# Patient Record
Sex: FEMALE | NOT HISPANIC OR LATINO | Employment: PART TIME | URBAN - METROPOLITAN AREA
[De-identification: names, ages, dates, MRNs, and addresses within clinical notes are randomized per-mention and may not be internally consistent; named-entity substitution may affect disease eponyms.]

---

## 2017-02-03 DIAGNOSIS — E66.9 OBESITY, UNSPECIFIED OBESITY SEVERITY, UNSPECIFIED OBESITY TYPE: ICD-10-CM

## 2017-02-03 RX ORDER — PHENTERMINE HYDROCHLORIDE 37.5 MG/1
37.5 CAPSULE ORAL EVERY MORNING
Qty: 30 CAP | Refills: 0 | Status: SHIPPED | OUTPATIENT
Start: 2017-02-03 | End: 2017-04-17

## 2017-02-03 NOTE — TELEPHONE ENCOUNTER
Was the patient seen in the last year in this department? Yes 10/20/2016    Does patient have an active prescription for medications requested? No     Received Request Via: Patient

## 2017-03-06 ENCOUNTER — OFFICE VISIT (OUTPATIENT)
Dept: MEDICAL GROUP | Facility: PHYSICIAN GROUP | Age: 37
End: 2017-03-06
Payer: COMMERCIAL

## 2017-03-06 VITALS
WEIGHT: 170 LBS | RESPIRATION RATE: 16 BRPM | OXYGEN SATURATION: 96 % | HEART RATE: 78 BPM | HEIGHT: 61 IN | BODY MASS INDEX: 32.1 KG/M2 | TEMPERATURE: 97.5 F | SYSTOLIC BLOOD PRESSURE: 122 MMHG | DIASTOLIC BLOOD PRESSURE: 88 MMHG

## 2017-03-06 DIAGNOSIS — E66.9 OBESITY, UNSPECIFIED OBESITY SEVERITY, UNSPECIFIED OBESITY TYPE: ICD-10-CM

## 2017-03-06 PROCEDURE — 99214 OFFICE O/P EST MOD 30 MIN: CPT | Performed by: NURSE PRACTITIONER

## 2017-03-06 RX ORDER — PHENTERMINE HYDROCHLORIDE 37.5 MG/1
37.5 TABLET ORAL
Qty: 30 TAB | Refills: 0 | Status: SHIPPED | OUTPATIENT
Start: 2017-03-06 | End: 2017-04-17 | Stop reason: SDUPTHER

## 2017-03-06 NOTE — MR AVS SNAPSHOT
"        Ceci Alan   3/6/2017 2:40 PM   Office Visit   MRN: [id number]    Department:  [facility]   Dept Phone:  [phone]    Description:  Female : 1980   Provider:  NARENDRA Shields           Reason for Visit     Follow-Up medications       Allergies as of 3/6/2017     Allergen Noted Reactions    Pcn [Penicillins] 2009         You were diagnosed with     Obesity, unspecified obesity severity, unspecified obesity type   [[id number]]         Vital Signs     Blood Pressure Pulse Temperature Respirations Height Weight    122/88 mmHg 78 36.4 °C (97.5 °F) 16 1.549 m (5' 0.98\") 77.111 kg (170 lb)    Body Mass Index Oxygen Saturation Last Menstrual Period Smoking Status          32.14 kg/m2 96% 02/10/2017 Never Smoker         Basic Information     Date Of Birth Sex Race Ethnicity Preferred Language    1980 Female  or  Non- English      Your appointments     Apr 10, 2017  8:40 AM   Established Patient with NARENDRA Shields   [address] [address]   [phone]           You will be receiving a confirmation call a few days before your appointment from our automated call confirmation system.              Problem List              ICD-10-CM Priority Class Noted - Resolved    PCOS (polycystic ovarian syndrome) E28.2   2013 - Present    Obesity E66.9   2016 - Present    Acne L70.9   10/20/2016 - Present      Health Maintenance        Date Due Completion Dates    IMM DTaP/Tdap/Td Vaccine (1 - Tdap) 2000 ---    PAP SMEAR 2002 ---    IMM INFLUENZA (1) 2016 ---            Current Immunizations     No immunizations on file.      Below and/or attached are the medications your provider expects you to take. Review all of your home medications and newly ordered medications with your provider and/or pharmacist. Follow medication instructions as directed by your provider " and/or pharmacist. Please keep your medication list with you and share with your provider. Update the information when medications are discontinued, doses are changed, or new medications (including over-the-counter products) are added; and carry medication information at all times in the event of emergency situations     Allergies:  PCN - (reactions not documented)               Medications  Valid as of: March 06, 2017 -  3:09 PM    Generic Name Brand Name Tablet Size Instructions for use    Azithromycin (Tab) ZITHROMAX 250 MG 2 po today, one daily until finished.        Clindamycin Phosphate (Gel) CLEOCIN T 1 % Apply twice a day        Ergocalciferol (Cap) DRISDOL 62240 UNIT Take 1 cap by mouth every day for 10 days, and then take 1 cap by mouth every Tuesday and Saturday.        MetFORMIN HCl (Tab) GLUCOPHAGE 500 MG Take 500 mg by mouth 2 times a day, with meals. Takes to regulate her period. Her doctor is Bandar Shen         Phentermine HCl (Cap) phentermine 37.5 MG Take 1 Cap by mouth every morning.        Phentermine HCl (Tab) ADIPEX-P 37.5 MG Take 1 Tab by mouth every morning before breakfast.        Tretinoin (Cream) RETIN-A 0.05 % Apply at bedtime        .                 Medicines prescribed today were sent to:     [email or web address] [address] [place] [address] [place], [place] - [address] [facility] [place] [address] [address] [place] [address]    Phone: [phone] Fax: [phone]    Open 24 Hours?: No      Medication refill instructions:       If your prescription bottle indicates you have medication refills left, it is not necessary to call your provider’s office. Please contact your pharmacy and they will refill your medication.    If your prescription bottle indicates you do not have any refills left, you may request refills at any time through one of the following ways: The online [employer] system (except Urgent Care), by calling your provider’s office, or by asking your pharmacy to contact your  provider’s office with a refill request. Medication refills are processed only during regular business hours and may not be available until the next business day. Your provider may request additional information or to have a follow-up visit with you prior to refilling your medication.   *Please Note: Medication refills are assigned a new Rx number when refilled electronically. Your pharmacy may indicate that no refills were authorized even though a new prescription for the same medication is available at the pharmacy. Please request the medicine by name with the pharmacy before contacting your provider for a refill.           [employer] Access Code: Activation code not generated  Current [employer] Status: Active

## 2017-03-07 NOTE — PROGRESS NOTES
Chief Complaint   Patient presents with   • Follow-Up     medications        HISTORY OF PRESENT ILLNESS: Patient is a 36 y.o. female established patient who presents today to discuss the following issues:    Obesity  Plans to start scheduling time at the gym so that she stops making excuses.  Still has a few phentermine left from November.  We discussed diet, exercise, and lifestyle modification at length.  She would like to restart the phentermine, start working out at the gym, and follow-up with me monthly to maintain accountability.      Patient Active Problem List    Diagnosis Date Noted   • Acne 10/20/2016   • Obesity 09/20/2016   • PCOS (polycystic ovarian syndrome) 05/13/2013       Allergies:Pcn    Current Outpatient Prescriptions   Medication Sig Dispense Refill   • phentermine (ADIPEX-P) 37.5 MG tablet Take 1 Tab by mouth every morning before breakfast. 30 Tab 0   • clindamycin (CLEOCIN T) 1 % Gel Apply twice a day 1 Tube 6   • tretinoin (RETIN-A) 0.05 % cream Apply at bedtime 45 g 3   • ergocalciferol (DRISDOL) 06897 UNIT capsule Take 1 cap by mouth every day for 10 days, and then take 1 cap by mouth every Tuesday and Saturday. 16 Cap 0   • phentermine 37.5 MG capsule Take 1 Cap by mouth every morning. 30 Cap 0   • azithromycin (ZITHROMAX) 250 MG Tab 2 po today, one daily until finished. 6 Tab 0   • METFORMIN 500 MG TABS Take 500 mg by mouth 2 times a day, with meals. Takes to regulate her period. Her doctor is Bandar Shen        No current facility-administered medications for this visit.       Social History   Substance Use Topics   • Smoking status: Never Smoker    • Smokeless tobacco: Never Used   • Alcohol Use: 0.0 oz/week     0 Cans of beer per week      Comment: occasionally       Family Status   Relation Status Death Age   • Mother Alive    • Father Alive    • Sister Alive    • Brother Alive    • Maternal Aunt Alive    • Maternal Uncle Alive    • Paternal Aunt Alive    • Paternal Uncle Alive   "  • Maternal Grandmother     • Maternal Grandfather     • Paternal Grandmother Alive    • Paternal Grandfather       Family History   Problem Relation Age of Onset   • Arthritis Mother    • Cancer Father      Brain   • Stroke Father    • Diabetes Maternal Grandmother    • Heart Disease Maternal Grandmother    • Hypertension Maternal Grandmother    • Hyperlipidemia Maternal Grandmother    • Diabetes Maternal Grandfather    • Heart Disease Maternal Grandfather    • Hypertension Maternal Grandfather    • Hyperlipidemia Maternal Grandfather        Review of Systems:   Constitutional: Negative for fever, chills, weight loss and malaise/fatigue.   HENT: Negative for ear pain, nosebleeds, congestion, sore throat and neck pain.    Eyes: Negative for blurred vision.   Respiratory: Negative for cough, sputum production, shortness of breath and wheezing.    Cardiovascular: Negative for chest pain, palpitations, orthopnea and leg swelling.   Gastrointestinal: Negative for heartburn, nausea, vomiting and abdominal pain.   Genitourinary: Negative for dysuria, urgency and frequency.   Musculoskeletal: Negative for myalgias, joint pain, and back pain.  Skin: Negative for rash and itching.   Neurological: Negative for dizziness, tingling, tremors, sensory change, focal weakness and headaches.   Endo/Heme/Allergies: Does not bruise/bleed easily.   Psychiatric/Behavioral: Negative for depression, suicidal ideas and memory loss.  The patient is not nervous/anxious and does not have insomnia.    All other systems reviewed and are negative except as in HPI.    Exam:  Blood pressure 122/88, pulse 78, temperature 36.4 °C (97.5 °F), resp. rate 16, height 1.549 m (5' 0.98\"), weight 77.111 kg (170 lb), last menstrual period 02/10/2017, SpO2 96 %.  General:  Well nourished, well developed female in NAD  Head: Grossly normal.  Neck: Supple without JVD or bruit. Thyroid is not enlarged.  Pulmonary: Clear to ausculation. " Normal effort. No rales, ronchi, or wheezing.  Cardiovascular: Regular rate and rhythm without murmur.   Extremities: No clubbing, cyanosis, or edema.  Skin: Intact with no obvious rashes or lesions.  Neuro: Grossly intact.  Psych: Alert and oriented x 3.  Mood and affect appropriate.    Medical decision-making and discussion: Ceci is here today to discuss weight loss.  She was given a prescription for phentermine, and she will start working out.  She will plan to follow-up monthly.          Assessment/Plan:  1. Obesity, unspecified obesity severity, unspecified obesity type  phentermine (ADIPEX-P) 37.5 MG tablet       Return in about 4 weeks (around 4/3/2017).    Please note that this dictation was created using voice recognition software. I have made every reasonable attempt to correct obvious errors, but I expect that there are errors of grammar and possibly content that I did not discover before finalizing the note.

## 2017-03-07 NOTE — ASSESSMENT & PLAN NOTE
Plans to start scheduling time at the gym so that she stops making excuses.  Still has a few phentermine left from November.  We discussed diet, exercise, and lifestyle modification at length.  She would like to restart the phentermine, start working out at the gym, and follow-up with me monthly to maintain accountability.

## 2017-04-17 ENCOUNTER — OFFICE VISIT (OUTPATIENT)
Dept: MEDICAL GROUP | Facility: PHYSICIAN GROUP | Age: 37
End: 2017-04-17
Payer: COMMERCIAL

## 2017-04-17 VITALS
SYSTOLIC BLOOD PRESSURE: 130 MMHG | WEIGHT: 171 LBS | HEART RATE: 70 BPM | RESPIRATION RATE: 16 BRPM | OXYGEN SATURATION: 94 % | DIASTOLIC BLOOD PRESSURE: 74 MMHG | HEIGHT: 60 IN | TEMPERATURE: 98 F | BODY MASS INDEX: 33.57 KG/M2

## 2017-04-17 DIAGNOSIS — E66.9 OBESITY, UNSPECIFIED OBESITY SEVERITY, UNSPECIFIED OBESITY TYPE: ICD-10-CM

## 2017-04-17 PROCEDURE — 99214 OFFICE O/P EST MOD 30 MIN: CPT | Performed by: NURSE PRACTITIONER

## 2017-04-17 RX ORDER — PHENTERMINE HYDROCHLORIDE 37.5 MG/1
37.5 TABLET ORAL
Qty: 30 TAB | Refills: 0 | Status: SHIPPED | OUTPATIENT
Start: 2017-04-17 | End: 2019-02-25 | Stop reason: SDUPTHER

## 2017-04-17 NOTE — ASSESSMENT & PLAN NOTE
Up 1 pound from last visit, but celebrated for her birthday.  Would like to continue with phentermine.  Discussed plan.

## 2017-04-17 NOTE — MR AVS SNAPSHOT
Ceci Phillips   2017 3:40 PM   Office Visit   MRN: [id number]    Department:  [facility]   Dept Phone:  [phone]    Description:  Female : 1980   Provider:  NARENDRA Hightower           Reason for Visit     Follow-Up medication       Allergies as of 2017     Allergen Noted Reactions    Pcn [Penicillins] 2009         You were diagnosed with     Obesity, unspecified obesity severity, unspecified obesity type   [[id number]]         Vital Signs     Blood Pressure Pulse Temperature Respirations Height Weight    130/74 mmHg 70 36.7 °C (98 °F) 16 1.524 m (5') 77.565 kg (171 lb)    Body Mass Index Oxygen Saturation Last Menstrual Period Smoking Status          33.40 kg/m2 94% 2017 Never Smoker         Basic Information     Date Of Birth Sex Race Ethnicity Preferred Language    1980 Female  or  Non- English      Problem List              ICD-10-CM Priority Class Noted - Resolved    PCOS (polycystic ovarian syndrome) E28.2   2013 - Present    Obesity E66.9   2016 - Present    Acne L70.9   10/20/2016 - Present      Health Maintenance        Date Due Completion Dates    IMM DTaP/Tdap/Td Vaccine (1 - Tdap) 2000 ---    PAP SMEAR 2002 ---            Current Immunizations     No immunizations on file.      Below and/or attached are the medications your provider expects you to take. Review all of your home medications and newly ordered medications with your provider and/or pharmacist. Follow medication instructions as directed by your provider and/or pharmacist. Please keep your medication list with you and share with your provider. Update the information when medications are discontinued, doses are changed, or new medications (including over-the-counter products) are added; and carry medication information at all times in the event of emergency situations     Allergies:  PCN - (reactions not documented)               Medications  Valid as  of: April 17, 2017 -  4:04 PM    Generic Name Brand Name Tablet Size Instructions for use    Azithromycin (Tab) ZITHROMAX 250 MG 2 po today, one daily until finished.        Clindamycin Phosphate (Gel) CLEOCIN T 1 % Apply twice a day        Ergocalciferol (Cap) DRISDOL 42703 UNIT Take 1 cap by mouth every day for 10 days, and then take 1 cap by mouth every Tuesday and Saturday.        MetFORMIN HCl (Tab) GLUCOPHAGE 500 MG Take 500 mg by mouth 2 times a day, with meals. Takes to regulate her period. Her doctor is Bandar Shen         Phentermine HCl (Tab) ADIPEX-P 37.5 MG Take 1 Tab by mouth every morning before breakfast.        Tretinoin (Cream) RETIN-A 0.05 % Apply at bedtime        .                 Medicines prescribed today were sent to:     [facility] [address] [place] [address], [place] [address] [facility] [place] [address] [address] [place] [address]    Phone: [phone] Fax: [phone]    Open 24 Hours?: No      Medication refill instructions:       If your prescription bottle indicates you have medication refills left, it is not necessary to call your provider’s office. Please contact your pharmacy and they will refill your medication.    If your prescription bottle indicates you do not have any refills left, you may request refills at any time through one of the following ways: The online [employer] system (except Urgent Care), by calling your provider’s office, or by asking your pharmacy to contact your provider’s office with a refill request. Medication refills are processed only during regular business hours and may not be available until the next business day. Your provider may request additional information or to have a follow-up visit with you prior to refilling your medication.   *Please Note: Medication refills are assigned a new Rx number when refilled electronically. Your pharmacy may indicate that no refills were authorized even though a new prescription for the same medication  is available at the pharmacy. Please request the medicine by name with the pharmacy before contacting your provider for a refill.           MyChart Access Code: Activation code not generated  Current MyChart Status: Active

## 2017-04-18 NOTE — PROGRESS NOTES
Chief Complaint   Patient presents with   • Follow-Up     medication        HISTORY OF PRESENT ILLNESS: Patient is a 37 y.o. female established patient who presents today to discuss the following issues:    Obesity  Up 1 pound from last visit, but celebrated for her birthday.  Would like to continue with phentermine.  Discussed plan.        Patient Active Problem List    Diagnosis Date Noted   • Acne 10/20/2016   • Obesity 2016   • PCOS (polycystic ovarian syndrome) 2013       Allergies:Pcn    Current Outpatient Prescriptions   Medication Sig Dispense Refill   • phentermine (ADIPEX-P) 37.5 MG tablet Take 1 Tab by mouth every morning before breakfast. 30 Tab 0   • clindamycin (CLEOCIN T) 1 % Gel Apply twice a day 1 Tube 6   • tretinoin (RETIN-A) 0.05 % cream Apply at bedtime 45 g 3   • ergocalciferol (DRISDOL) 26583 UNIT capsule Take 1 cap by mouth every day for 10 days, and then take 1 cap by mouth every Tuesday and Saturday. 16 Cap 0   • METFORMIN 500 MG TABS Take 500 mg by mouth 2 times a day, with meals. Takes to regulate her period. Her doctor is Bandar Shen      • azithromycin (ZITHROMAX) 250 MG Tab 2 po today, one daily until finished. 6 Tab 0     No current facility-administered medications for this visit.       Social History   Substance Use Topics   • Smoking status: Never Smoker    • Smokeless tobacco: Never Used   • Alcohol Use: 0.0 oz/week     0 Cans of beer per week      Comment: occasionally       Family Status   Relation Status Death Age   • Mother Alive    • Father Alive    • Sister Alive    • Brother Alive    • Maternal Aunt Alive    • Maternal Uncle Alive    • Paternal Aunt Alive    • Paternal Uncle Alive    • Maternal Grandmother     • Maternal Grandfather     • Paternal Grandmother Alive    • Paternal Grandfather       Family History   Problem Relation Age of Onset   • Arthritis Mother    • Cancer Father      Brain   • Stroke Father    • Diabetes Maternal  Grandmother    • Heart Disease Maternal Grandmother    • Hypertension Maternal Grandmother    • Hyperlipidemia Maternal Grandmother    • Diabetes Maternal Grandfather    • Heart Disease Maternal Grandfather    • Hypertension Maternal Grandfather    • Hyperlipidemia Maternal Grandfather        Review of Systems:   Constitutional: Negative for fever, chills, weight loss and malaise/fatigue.   HENT: Negative for ear pain, nosebleeds, congestion, sore throat and neck pain.    Eyes: Negative for blurred vision.   Respiratory: Negative for cough, sputum production, shortness of breath and wheezing.    Cardiovascular: Negative for chest pain, palpitations, orthopnea and leg swelling.   Gastrointestinal: Negative for heartburn, nausea, vomiting and abdominal pain.   Genitourinary: Negative for dysuria, urgency and frequency.   Musculoskeletal: Negative for myalgias, joint pain, and back pain.  Skin: Negative for rash and itching.   Neurological: Negative for dizziness, tingling, tremors, sensory change, focal weakness and headaches.   Endo/Heme/Allergies: Does not bruise/bleed easily.   Psychiatric/Behavioral: Negative for depression, suicidal ideas and memory loss.  The patient is not nervous/anxious and does not have insomnia.    All other systems reviewed and are negative except as in HPI.    Exam:  Blood pressure 130/74, pulse 70, temperature 36.7 °C (98 °F), resp. rate 16, height 1.524 m (5'), weight 77.565 kg (171 lb), last menstrual period 03/27/2017, SpO2 94 %.  General:  Well nourished, well developed female in NAD  Head: Grossly normal.  Neck: Supple without JVD or bruit. Thyroid is not enlarged.  Pulmonary: Clear to ausculation. Normal effort. No rales, ronchi, or wheezing.  Cardiovascular: Regular rate and rhythm without murmur.   Extremities: No clubbing, cyanosis, or edema.  Skin: Intact with no obvious rashes or lesions.  Neuro: Grossly intact.  Psych: Alert and oriented x 3.  Mood and affect  appropriate.    Medical decision-making and discussion: Ceci is here today for follow-up.  She was given a prescription for phentermine.  She will follow-up here in about 1 month.          Assessment/Plan:  1. Obesity, unspecified obesity severity, unspecified obesity type  phentermine (ADIPEX-P) 37.5 MG tablet       Return in about 4 weeks (around 5/15/2017).    Please note that this dictation was created using voice recognition software. I have made every reasonable attempt to correct obvious errors, but I expect that there are errors of grammar and possibly content that I did not discover before finalizing the note.

## 2018-09-06 ENCOUNTER — OFFICE VISIT (OUTPATIENT)
Dept: MEDICAL GROUP | Facility: PHYSICIAN GROUP | Age: 38
End: 2018-09-06
Payer: COMMERCIAL

## 2018-09-06 VITALS
DIASTOLIC BLOOD PRESSURE: 80 MMHG | TEMPERATURE: 97.6 F | BODY MASS INDEX: 34.95 KG/M2 | HEIGHT: 60 IN | OXYGEN SATURATION: 97 % | WEIGHT: 178 LBS | HEART RATE: 68 BPM | RESPIRATION RATE: 16 BRPM | SYSTOLIC BLOOD PRESSURE: 120 MMHG

## 2018-09-06 DIAGNOSIS — Z00.00 WELLNESS EXAMINATION: ICD-10-CM

## 2018-09-06 DIAGNOSIS — L85.3 DRY SKIN: ICD-10-CM

## 2018-09-06 PROCEDURE — 99214 OFFICE O/P EST MOD 30 MIN: CPT | Performed by: NURSE PRACTITIONER

## 2018-09-06 RX ORDER — PHENTERMINE HYDROCHLORIDE 37.5 MG/1
37.5 TABLET ORAL
Qty: 30 TAB | Refills: 0 | Status: SHIPPED | OUTPATIENT
Start: 2018-09-06 | End: 2018-10-06

## 2018-09-06 ASSESSMENT — PATIENT HEALTH QUESTIONNAIRE - PHQ9: CLINICAL INTERPRETATION OF PHQ2 SCORE: 0

## 2018-09-06 NOTE — PROGRESS NOTES
Chief Complaint   Patient presents with   • Orders Needed     labs   • Breast Problem     dry patch on left breast        HISTORY OF PRESENT ILLNESS: Patient is a 38 y.o. female established patient who presents today to discuss the following issues:    Wellness examination  Is here for her annual wellness visit.  Is due for lab work.    Dry skin  Would like a refill of hydrocortisone.    BMI 34.0-34.9,adult  Patient is aware of BMI elevation.  Brief discussion of diet, exercise, and lifestyle modification.    Ceci would like to restart phentermine.  It works well for her and she tolerates it well.      Patient Active Problem List    Diagnosis Date Noted   • Wellness examination 10/08/2018   • Dry skin 10/08/2018   • BMI 34.0-34.9,adult 10/08/2018   • Acne 10/20/2016   • Obesity 2016   • PCOS (polycystic ovarian syndrome) 2013       Allergies:Pcn [penicillins]    Current Outpatient Prescriptions   Medication Sig Dispense Refill   • hydrocortisone 2.5 % Cream topical cream Apply 1 Application to affected area(s) 2 times a day. 1 Tube 2   • METFORMIN 500 MG TABS Take 500 mg by mouth 2 times a day, with meals. Takes to regulate her period. Her doctor is Bandar Shen      • ergocalciferol (DRISDOL) 40177 UNIT capsule Take 1 cap by mouth every day for 10 days, and then take 1 cap by mouth every Tuesday and Saturday until prescription is completed. 16 Cap 0   • phentermine (ADIPEX-P) 37.5 MG tablet Take 1 Tab by mouth every morning before breakfast. 30 Tab 0     No current facility-administered medications for this visit.        Social History   Substance Use Topics   • Smoking status: Never Smoker   • Smokeless tobacco: Never Used   • Alcohol use 0.0 oz/week      Comment: occasionally       Family Status   Relation Status   • Mo Alive   • Fa Alive   • Sis Alive   • Bro Alive   • MAunt Alive   • MUnc Alive   • PAunt Alive   • PUnc Alive   • MGMo    • MGFa    • PGMo Alive   • PGFa       Family History   Problem Relation Age of Onset   • Arthritis Mother    • Cancer Father         Brain   • Stroke Father    • Diabetes Maternal Grandmother    • Heart Disease Maternal Grandmother    • Hypertension Maternal Grandmother    • Hyperlipidemia Maternal Grandmother    • Diabetes Maternal Grandfather    • Heart Disease Maternal Grandfather    • Hypertension Maternal Grandfather    • Hyperlipidemia Maternal Grandfather        Review of Systems:   Constitutional: Negative for fever, chills, weight loss and malaise/fatigue.   HENT: Negative for ear pain, nosebleeds, congestion, sore throat and neck pain.    Eyes: Negative for blurred vision.   Respiratory: Negative for cough, sputum production, shortness of breath and wheezing.    Cardiovascular: Negative for chest pain, palpitations, orthopnea and leg swelling.   Gastrointestinal: Negative for heartburn, nausea, vomiting and abdominal pain.   Genitourinary: Negative for dysuria, urgency and frequency.   Musculoskeletal: Negative for myalgias, joint pain, and back pain.  Skin: Negative for rash and itching.  Positive for dry skin.  Neurological: Negative for dizziness, tingling, tremors, sensory change, focal weakness and headaches.   Endo/Heme/Allergies: Does not bruise/bleed easily.   Psychiatric/Behavioral: Negative for depression, suicidal ideas and memory loss.  The patient is not nervous/anxious and does not have insomnia.    All other systems reviewed and are negative except as in HPI.    Exam:  Blood pressure 120/80, pulse 68, temperature 36.4 °C (97.6 °F), resp. rate 16, height 1.524 m (5'), weight 80.7 kg (178 lb), SpO2 97 %, not currently breastfeeding.  General:  Well nourished, well developed female in NAD  Head: Grossly normal.  Neck: Supple without JVD or bruit. Thyroid is not enlarged.  Pulmonary: Clear to ausculation. Normal effort. No rales, ronchi, or wheezing.  Cardiovascular: Regular rate and rhythm without murmur.   Abdomen:  Soft,  nontender, nondistended.  Extremities: No clubbing, cyanosis, or edema.  Skin: Intact with no obvious rashes or lesions.  Neuro: Grossly intact.  Psych: Alert and oriented x 3.  Mood and affect appropriate.    Medical decision-making and discussion: Ceci is here today for her annual wellness visit.  Lab work was ordered, and she was given prescriptions for hydrocortisone and phentermine.  SHe will follow-up here as needed.          Assessment/Plan:  1. Wellness examination  CBC WITH DIFFERENTIAL    COMP METABOLIC PANEL    LIPID PROFILE    TSH    VITAMIN D,25 HYDROXY   2. Dry skin  hydrocortisone 2.5 % Cream topical cream   3. BMI 34.0-34.9,adult  phentermine (ADIPEX-P) 37.5 MG tablet       Return if symptoms worsen or fail to improve.    Please note that this dictation was created using voice recognition software. I have made every reasonable attempt to correct obvious errors, but I expect that there are errors of grammar and possibly content that I did not discover before finalizing the note.

## 2018-09-13 ENCOUNTER — HOSPITAL ENCOUNTER (OUTPATIENT)
Dept: LAB | Facility: MEDICAL CENTER | Age: 38
End: 2018-09-13
Attending: NURSE PRACTITIONER
Payer: COMMERCIAL

## 2018-09-13 DIAGNOSIS — Z00.00 WELLNESS EXAMINATION: ICD-10-CM

## 2018-09-13 LAB
25(OH)D3 SERPL-MCNC: 22 NG/ML (ref 30–100)
ALBUMIN SERPL BCP-MCNC: 4.5 G/DL (ref 3.2–4.9)
ALBUMIN/GLOB SERPL: 1.5 G/DL
ALP SERPL-CCNC: 78 U/L (ref 30–99)
ALT SERPL-CCNC: 48 U/L (ref 2–50)
ANION GAP SERPL CALC-SCNC: 10 MMOL/L (ref 0–11.9)
AST SERPL-CCNC: 22 U/L (ref 12–45)
BASOPHILS # BLD AUTO: 0.9 % (ref 0–1.8)
BASOPHILS # BLD: 0.06 K/UL (ref 0–0.12)
BILIRUB SERPL-MCNC: 0.7 MG/DL (ref 0.1–1.5)
BUN SERPL-MCNC: 13 MG/DL (ref 8–22)
CALCIUM SERPL-MCNC: 9.2 MG/DL (ref 8.5–10.5)
CHLORIDE SERPL-SCNC: 105 MMOL/L (ref 96–112)
CHOLEST SERPL-MCNC: 151 MG/DL (ref 100–199)
CO2 SERPL-SCNC: 24 MMOL/L (ref 20–33)
CREAT SERPL-MCNC: 0.63 MG/DL (ref 0.5–1.4)
EOSINOPHIL # BLD AUTO: 0.2 K/UL (ref 0–0.51)
EOSINOPHIL NFR BLD: 3.1 % (ref 0–6.9)
ERYTHROCYTE [DISTWIDTH] IN BLOOD BY AUTOMATED COUNT: 39.3 FL (ref 35.9–50)
GLOBULIN SER CALC-MCNC: 3.1 G/DL (ref 1.9–3.5)
GLUCOSE SERPL-MCNC: 92 MG/DL (ref 65–99)
HCT VFR BLD AUTO: 42.2 % (ref 37–47)
HDLC SERPL-MCNC: 40 MG/DL
HGB BLD-MCNC: 13.9 G/DL (ref 12–16)
IMM GRANULOCYTES # BLD AUTO: 0.01 K/UL (ref 0–0.11)
IMM GRANULOCYTES NFR BLD AUTO: 0.2 % (ref 0–0.9)
LDLC SERPL CALC-MCNC: 94 MG/DL
LYMPHOCYTES # BLD AUTO: 1.87 K/UL (ref 1–4.8)
LYMPHOCYTES NFR BLD: 28.7 % (ref 22–41)
MCH RBC QN AUTO: 27.3 PG (ref 27–33)
MCHC RBC AUTO-ENTMCNC: 32.9 G/DL (ref 33.6–35)
MCV RBC AUTO: 82.9 FL (ref 81.4–97.8)
MONOCYTES # BLD AUTO: 0.28 K/UL (ref 0–0.85)
MONOCYTES NFR BLD AUTO: 4.3 % (ref 0–13.4)
NEUTROPHILS # BLD AUTO: 4.1 K/UL (ref 2–7.15)
NEUTROPHILS NFR BLD: 62.8 % (ref 44–72)
NRBC # BLD AUTO: 0 K/UL
NRBC BLD-RTO: 0 /100 WBC
PLATELET # BLD AUTO: 240 K/UL (ref 164–446)
PMV BLD AUTO: 13 FL (ref 9–12.9)
POTASSIUM SERPL-SCNC: 4 MMOL/L (ref 3.6–5.5)
PROT SERPL-MCNC: 7.6 G/DL (ref 6–8.2)
RBC # BLD AUTO: 5.09 M/UL (ref 4.2–5.4)
SODIUM SERPL-SCNC: 139 MMOL/L (ref 135–145)
TRIGL SERPL-MCNC: 84 MG/DL (ref 0–149)
TSH SERPL DL<=0.005 MIU/L-ACNC: 1.47 UIU/ML (ref 0.38–5.33)
WBC # BLD AUTO: 6.5 K/UL (ref 4.8–10.8)

## 2018-09-13 PROCEDURE — 80053 COMPREHEN METABOLIC PANEL: CPT

## 2018-09-13 PROCEDURE — 80061 LIPID PANEL: CPT

## 2018-09-13 PROCEDURE — 85025 COMPLETE CBC W/AUTO DIFF WBC: CPT

## 2018-09-13 PROCEDURE — 36415 COLL VENOUS BLD VENIPUNCTURE: CPT

## 2018-09-13 PROCEDURE — 82306 VITAMIN D 25 HYDROXY: CPT

## 2018-09-13 PROCEDURE — 84443 ASSAY THYROID STIM HORMONE: CPT

## 2018-09-13 RX ORDER — ERGOCALCIFEROL 1.25 MG/1
CAPSULE ORAL
Qty: 16 CAP | Refills: 0 | Status: SHIPPED | OUTPATIENT
Start: 2018-09-13 | End: 2019-02-25

## 2018-10-08 PROBLEM — L85.3 DRY SKIN: Status: ACTIVE | Noted: 2018-10-08

## 2018-10-08 PROBLEM — Z00.00 WELLNESS EXAMINATION: Status: ACTIVE | Noted: 2018-10-08

## 2018-10-08 NOTE — ASSESSMENT & PLAN NOTE
Patient is aware of BMI elevation.  Brief discussion of diet, exercise, and lifestyle modification.    Ceci would like to restart phentermine.  It works well for her and she tolerates it well.

## 2018-12-14 ENCOUNTER — TELEPHONE (OUTPATIENT)
Dept: MEDICAL GROUP | Facility: PHYSICIAN GROUP | Age: 38
End: 2018-12-14

## 2018-12-14 NOTE — TELEPHONE ENCOUNTER
1. Caller Name: Ceci                                          Call Back Number: [phone] (home)        Patient approves a detailed voicemail message: N\A    Pt called stating that her insurance claims that her labs done on 09/13/18 did not have a Dx code. Pt was informed that she would be contacted with a response

## 2018-12-14 NOTE — TELEPHONE ENCOUNTER
Called Billing department and Kenisha confirmed with me that there was a Dx code on the labs and that her insurance doesn't not appear to cover annual labs per [facility] billing department. In addition, the pt was notified on 12/14/18 @ 1971    Kenisha's extension: [phone]

## 2019-02-25 ENCOUNTER — OFFICE VISIT (OUTPATIENT)
Dept: MEDICAL GROUP | Facility: PHYSICIAN GROUP | Age: 39
End: 2019-02-25
Payer: COMMERCIAL

## 2019-02-25 VITALS
OXYGEN SATURATION: 97 % | WEIGHT: 175 LBS | RESPIRATION RATE: 16 BRPM | HEIGHT: 60 IN | DIASTOLIC BLOOD PRESSURE: 76 MMHG | TEMPERATURE: 97.5 F | HEART RATE: 75 BPM | SYSTOLIC BLOOD PRESSURE: 112 MMHG | BODY MASS INDEX: 34.36 KG/M2

## 2019-02-25 DIAGNOSIS — E66.9 OBESITY, UNSPECIFIED OBESITY SEVERITY, UNSPECIFIED OBESITY TYPE: ICD-10-CM

## 2019-02-25 PROBLEM — Z00.00 WELLNESS EXAMINATION: Status: RESOLVED | Noted: 2018-10-08 | Resolved: 2019-02-25

## 2019-02-25 PROCEDURE — 99214 OFFICE O/P EST MOD 30 MIN: CPT | Performed by: NURSE PRACTITIONER

## 2019-02-25 RX ORDER — PHENTERMINE HYDROCHLORIDE 37.5 MG/1
37.5 TABLET ORAL
Qty: 30 TAB | Refills: 0 | Status: SHIPPED | OUTPATIENT
Start: 2019-02-25 | End: 2019-03-25 | Stop reason: SDUPTHER

## 2019-02-25 ASSESSMENT — PATIENT HEALTH QUESTIONNAIRE - PHQ9: CLINICAL INTERPRETATION OF PHQ2 SCORE: 0

## 2019-02-25 NOTE — ASSESSMENT & PLAN NOTE
Has lost 3 pounds since her last visit.  Is planning to get back on track with her  who has lost a considerable amount of weight recently.  She would like a prescription for phentermine.

## 2019-02-25 NOTE — PROGRESS NOTES
Chief Complaint   Patient presents with   • Medication Refill     Phentermine.        HISTORY OF PRESENT ILLNESS: Patient is a 38 y.o. female established patient who presents today to discuss the following issues:    BMI 34.0-34.9,adult  Has lost 3 pounds since her last visit.  Is planning to get back on track with her  who has lost a considerable amount of weight recently.  She would like a prescription for phentermine.      Patient Active Problem List    Diagnosis Date Noted   • Dry skin 10/08/2018   • BMI 34.0-34.9,adult 10/08/2018   • Acne 10/20/2016   • PCOS (polycystic ovarian syndrome) 2013       Allergies:Pcn [penicillins]    Current Outpatient Prescriptions   Medication Sig Dispense Refill   • phentermine (ADIPEX-P) 37.5 MG tablet Take 1 Tab by mouth every morning before breakfast for 30 days. 30 Tab 0     No current facility-administered medications for this visit.        Social History   Substance Use Topics   • Smoking status: Never Smoker   • Smokeless tobacco: Never Used   • Alcohol use 0.0 oz/week      Comment: occasionally       Family Status   Relation Status   • Mo Alive   • Fa Alive   • Sis Alive   • Bro Alive   • MAunt Alive   • MUnc Alive   • PAunt Alive   • PUnc Alive   • MGMo    • MGFa    • PGMo Alive   • PGFa      Family History   Problem Relation Age of Onset   • Arthritis Mother    • Cancer Father         Brain   • Stroke Father    • Diabetes Maternal Grandmother    • Heart Disease Maternal Grandmother    • Hypertension Maternal Grandmother    • Hyperlipidemia Maternal Grandmother    • Diabetes Maternal Grandfather    • Heart Disease Maternal Grandfather    • Hypertension Maternal Grandfather    • Hyperlipidemia Maternal Grandfather        Review of Systems:   Constitutional: Negative for fever, chills, weight loss and malaise/fatigue.   HENT: Negative for ear pain, nosebleeds, congestion, sore throat and neck pain.    Eyes: Negative for blurred vision.    Respiratory: Negative for cough, sputum production, shortness of breath and wheezing.    Cardiovascular: Negative for chest pain, palpitations, orthopnea and leg swelling.   Gastrointestinal: Negative for heartburn, nausea, vomiting and abdominal pain.   Genitourinary: Negative for dysuria, urgency and frequency.   Musculoskeletal: Negative for myalgias, joint pain, and back pain.  Skin: Negative for rash and itching.   Neurological: Negative for dizziness, tingling, tremors, sensory change, focal weakness and headaches.   Endo/Heme/Allergies: Does not bruise/bleed easily.   Psychiatric/Behavioral: Negative for depression, suicidal ideas and memory loss.  The patient is not nervous/anxious and does not have insomnia.    All other systems reviewed and are negative except as in HPI.    Exam:  Blood pressure 112/76, pulse 75, temperature 36.4 °C (97.5 °F), resp. rate 16, height 1.524 m (5'), weight 79.4 kg (175 lb), last menstrual period 01/28/2019, SpO2 97 %, not currently breastfeeding.  General:  Well nourished, well developed female in NAD  Head: Grossly normal.  Neck: Supple without JVD or bruit. Thyroid is not enlarged.  Pulmonary: Clear to ausculation. Normal effort. No rales, ronchi, or wheezing.  Cardiovascular: Regular rate and rhythm without murmur.   Abdomen:  Soft, nontender, nondistended.  Extremities: No clubbing, cyanosis, or edema.  Skin: Intact with no obvious rashes or lesions.  Neuro: Grossly intact.  Psych: Alert and oriented x 3.  Mood and affect appropriate.    Medical decision-making and discussion: Ceci is here today for follow-up.  She was given a prescription for phentermine and she will follow-up here as needed.          Assessment/Plan:  1. Obesity, unspecified obesity severity, unspecified obesity type  phentermine (ADIPEX-P) 37.5 MG tablet   2. BMI 34.0-34.9,adult         Return if symptoms worsen or fail to improve.    Please note that this dictation was created using voice  recognition software. I have made every reasonable attempt to correct obvious errors, but I expect that there are errors of grammar and possibly content that I did not discover before finalizing the note.

## 2019-03-25 ENCOUNTER — OFFICE VISIT (OUTPATIENT)
Dept: MEDICAL GROUP | Facility: PHYSICIAN GROUP | Age: 39
End: 2019-03-25
Payer: COMMERCIAL

## 2019-03-25 VITALS
TEMPERATURE: 98.1 F | DIASTOLIC BLOOD PRESSURE: 80 MMHG | WEIGHT: 170 LBS | BODY MASS INDEX: 33.38 KG/M2 | HEART RATE: 85 BPM | SYSTOLIC BLOOD PRESSURE: 118 MMHG | OXYGEN SATURATION: 97 % | HEIGHT: 60 IN | RESPIRATION RATE: 16 BRPM

## 2019-03-25 DIAGNOSIS — E66.9 OBESITY, UNSPECIFIED OBESITY SEVERITY, UNSPECIFIED OBESITY TYPE: ICD-10-CM

## 2019-03-25 PROCEDURE — 99214 OFFICE O/P EST MOD 30 MIN: CPT | Performed by: NURSE PRACTITIONER

## 2019-03-25 RX ORDER — PHENTERMINE HYDROCHLORIDE 37.5 MG/1
37.5 TABLET ORAL
Qty: 30 TAB | Refills: 0 | Status: SHIPPED | OUTPATIENT
Start: 2019-03-25 | End: 2019-04-24

## 2019-03-26 NOTE — PROGRESS NOTES
Chief Complaint   Patient presents with   • Medication Refill       HISTORY OF PRESENT ILLNESS: Patient is a 38 y.o. female established patient who presents today to discuss the following issues:    BMI 33.0-33.9,adult  Has lost 5 pounds since her last visit.  Has been taking 1/2-1 tablet of phentermine daily and it seems to be working well for her. Would like to continue.        Patient Active Problem List    Diagnosis Date Noted   • Dry skin 10/08/2018   • BMI 33.0-33.9,adult 10/08/2018   • Acne 10/20/2016   • PCOS (polycystic ovarian syndrome) 2013       Allergies:Pcn [penicillins]    Current Outpatient Prescriptions   Medication Sig Dispense Refill   • Cholecalciferol (VITAMIN D PO) Take  by mouth.     • phentermine (ADIPEX-P) 37.5 MG tablet Take 1 Tab by mouth every morning before breakfast for 30 days. 30 Tab 0     No current facility-administered medications for this visit.        Social History   Substance Use Topics   • Smoking status: Never Smoker   • Smokeless tobacco: Never Used   • Alcohol use 0.0 oz/week      Comment: occasionally       Family Status   Relation Status   • Mo Alive   • Fa Alive   • Sis Alive   • Bro Alive   • MAunt Alive   • MUnc Alive   • PAunt Alive   • PUnc Alive   • MGMo    • MGFa    • PGMo Alive   • PGFa      Family History   Problem Relation Age of Onset   • Arthritis Mother    • Cancer Father         Brain   • Stroke Father    • Diabetes Maternal Grandmother    • Heart Disease Maternal Grandmother    • Hypertension Maternal Grandmother    • Hyperlipidemia Maternal Grandmother    • Diabetes Maternal Grandfather    • Heart Disease Maternal Grandfather    • Hypertension Maternal Grandfather    • Hyperlipidemia Maternal Grandfather        Review of Systems:   Constitutional: Negative for fever, chills, weight loss and malaise/fatigue.   HENT: Negative for ear pain, nosebleeds, congestion, sore throat and neck pain.    Eyes: Negative for blurred vision.    Respiratory: Negative for cough, sputum production, shortness of breath and wheezing.    Cardiovascular: Negative for chest pain, palpitations, orthopnea and leg swelling.   Gastrointestinal: Negative for heartburn, nausea, vomiting and abdominal pain.   Genitourinary: Negative for dysuria, urgency and frequency.   Musculoskeletal: Negative for myalgias, joint pain, and back pain.  Skin: Negative for rash and itching.   Neurological: Negative for dizziness, tingling, tremors, sensory change, focal weakness and headaches.   Endo/Heme/Allergies: Does not bruise/bleed easily.   Psychiatric/Behavioral: Negative for depression, suicidal ideas and memory loss.  The patient is not nervous/anxious and does not have insomnia.    All other systems reviewed and are negative except as in HPI.    Exam:  Blood pressure 118/80, pulse 85, temperature 36.7 °C (98.1 °F), resp. rate 16, height 1.524 m (5'), weight 77.1 kg (170 lb), last menstrual period 03/20/2019, SpO2 97 %, not currently breastfeeding.  General:  Well nourished, well developed female in NAD  Head: Grossly normal.  Neck: Supple without JVD or bruit. Thyroid is not enlarged.  Pulmonary: Clear to ausculation. Normal effort. No rales, ronchi, or wheezing.  Cardiovascular: Regular rate and rhythm without murmur.   Abdomen:  Soft, nontender, nondistended.  Extremities: No clubbing, cyanosis, or edema.  Skin: Intact with no obvious rashes or lesions.  Neuro: Grossly intact.  Psych: Alert and oriented x 3.  Mood and affect appropriate.    Medical decision-making and discussion: Ceci is here today for follow-up.  She was given a prescription for phentermine, and she will follow-up here as needed.          Assessment/Plan:  1. Obesity, unspecified obesity severity, unspecified obesity type  phentermine (ADIPEX-P) 37.5 MG tablet   2. BMI 33.0-33.9,adult         Return if symptoms worsen or fail to improve.    Please note that this dictation was created using voice  recognition software. I have made every reasonable attempt to correct obvious errors, but I expect that there are errors of grammar and possibly content that I did not discover before finalizing the note.

## 2019-03-26 NOTE — ASSESSMENT & PLAN NOTE
Has lost 5 pounds since her last visit.  Has been taking 1/2-1 tablet of phentermine daily and it seems to be working well for her. Would like to continue.

## 2019-10-30 ENCOUNTER — OFFICE VISIT (OUTPATIENT)
Dept: MEDICAL GROUP | Facility: PHYSICIAN GROUP | Age: 39
End: 2019-10-30
Payer: COMMERCIAL

## 2019-10-30 VITALS
WEIGHT: 179 LBS | SYSTOLIC BLOOD PRESSURE: 138 MMHG | RESPIRATION RATE: 16 BRPM | HEIGHT: 60 IN | BODY MASS INDEX: 35.14 KG/M2 | TEMPERATURE: 98 F | HEART RATE: 85 BPM | DIASTOLIC BLOOD PRESSURE: 84 MMHG | OXYGEN SATURATION: 98 %

## 2019-10-30 DIAGNOSIS — M79.602 LEFT ARM PAIN: ICD-10-CM

## 2019-10-30 PROCEDURE — 99214 OFFICE O/P EST MOD 30 MIN: CPT | Performed by: NURSE PRACTITIONER

## 2019-10-30 RX ORDER — IBUPROFEN 800 MG/1
800 TABLET ORAL EVERY 8 HOURS PRN
Qty: 60 TAB | Refills: 0 | Status: SHIPPED | OUTPATIENT
Start: 2019-10-30

## 2019-10-30 NOTE — LETTER
October 30, 2019         Patient: Ceci Phillips   YOB: 1980   Date of Visit: 10/30/2019           To Whom it May Concern:    Ceci Phillips was seen in my clinic on 10/30/2019.     If you have any questions or concerns, please don't hesitate to call.    Sincerely,       KIZZY Castaneda.  Electronically Signed

## 2019-12-01 PROBLEM — M79.602 LEFT ARM PAIN: Status: ACTIVE | Noted: 2019-12-01

## 2019-12-02 NOTE — ASSESSMENT & PLAN NOTE
A few days ago woke up with left arm pain and swelling.  The swelling has since resolved.  She thinks that maybe she slept on it funny.  Discussed options.  She would like a refill of ibuprofen and a referral to physical therapy.

## 2019-12-02 NOTE — PROGRESS NOTES
Chief Complaint   Patient presents with   • Arm Swelling     x 5 days       HISTORY OF PRESENT ILLNESS: Patient is a 39 y.o. female established patient who presents today to discuss the following issues:    BMI 34.0-34.9,adult  Patient is aware of BMI elevation.  Brief discussion of diet, exercise, and lifestyle modification.      Left arm pain  A few days ago woke up with left arm pain and swelling.  The swelling has since resolved.  She thinks that maybe she slept on it funny.  Discussed options.  She would like a refill of ibuprofen and a referral to physical therapy.      Patient Active Problem List    Diagnosis Date Noted   • Left arm pain 2019   • Dry skin 10/08/2018   • BMI 34.0-34.9,adult 10/08/2018   • Acne 10/20/2016   • PCOS (polycystic ovarian syndrome) 2013       Allergies:Pcn [penicillins]    Current Outpatient Medications   Medication Sig Dispense Refill   • ibuprofen (MOTRIN) 800 MG Tab Take 1 Tab by mouth every 8 hours as needed. 60 Tab 0   • Cholecalciferol (VITAMIN D PO) Take  by mouth.       No current facility-administered medications for this visit.        Social History     Tobacco Use   • Smoking status: Never Smoker   • Smokeless tobacco: Never Used   Substance Use Topics   • Alcohol use: Yes     Alcohol/week: 0.0 oz     Comment: occasionally   • Drug use: No       Family Status   Relation Name Status   • Mo  Alive   • Fa  Alive   • Sis  Alive   • Bro  Alive   • MAunt  Alive   • MUnc  Alive   • PAunt  Alive   • PUnc  Alive   • MGMo     • MGFa     • PGMo  Alive   • PGFa       Family History   Problem Relation Age of Onset   • Arthritis Mother    • Cancer Father         Brain   • Stroke Father    • Diabetes Maternal Grandmother    • Heart Disease Maternal Grandmother    • Hypertension Maternal Grandmother    • Hyperlipidemia Maternal Grandmother    • Diabetes Maternal Grandfather    • Heart Disease Maternal Grandfather    • Hypertension Maternal Grandfather     • Hyperlipidemia Maternal Grandfather        Review of Systems:   Constitutional: Negative for fever, chills, weight loss and malaise/fatigue.   HENT: Negative for ear pain, nosebleeds, congestion, sore throat and neck pain.    Eyes: Negative for blurred vision.   Respiratory: Negative for cough, sputum production, shortness of breath and wheezing.    Cardiovascular: Negative for chest pain, palpitations, orthopnea and leg swelling.   Gastrointestinal: Negative for heartburn, nausea, vomiting and abdominal pain.   Genitourinary: Negative for dysuria, urgency and frequency.   Musculoskeletal: Negative for myalgias, joint pain, and back pain.  Positive for left arm pain and swelling.  Skin: Negative for rash and itching.   Neurological: Negative for dizziness, tingling, tremors, sensory change, focal weakness and headaches.   Endo/Heme/Allergies: Does not bruise/bleed easily.   Psychiatric/Behavioral: Negative for depression, suicidal ideas and memory loss.  The patient is not nervous/anxious and does not have insomnia.    All other systems reviewed and are negative except as in HPI.    Exam:  Blood Pressure 138/84   Pulse 85   Temperature 36.7 °C (98 °F)   Respiration 16   Height 1.524 m (5')   Weight 81.2 kg (179 lb)   Oxygen Saturation 98%   General:  Well nourished, well developed female in NAD  Head: Grossly normal.  Neck: Supple without JVD or bruit. Thyroid is not enlarged.  Pulmonary: Clear to ausculation. Normal effort. No rales, ronchi, or wheezing.  Cardiovascular: Regular rate and rhythm without murmur.   Abdomen:  Soft, nontender, nondistended.  Extremities: No clubbing, cyanosis, or edema.  Skin: Intact with no obvious rashes or lesions.  Neuro: Grossly intact.  Psych: Alert and oriented x 3.  Mood and affect appropriate.    Medical decision-making and discussion: Ceci is here today for follow-up.  Her motrin was refilled, a referral was sent to physical therapy, and she will follow-up here as  needed.          Assessment/Plan:  1. Left arm pain  REFERRAL TO PHYSICAL THERAPY Reason for Therapy: Eval/Treat/Report    ibuprofen (MOTRIN) 800 MG Tab   2. BMI 34.0-34.9,adult  Patient identified as having weight management issue.  Appropriate orders and counseling given.       Return if symptoms worsen or fail to improve.    Please note that this dictation was created using voice recognition software. I have made every reasonable attempt to correct obvious errors, but I expect that there are errors of grammar and possibly content that I did not discover before finalizing the note.

## 2020-03-01 ENCOUNTER — OFFICE VISIT (OUTPATIENT)
Dept: URGENT CARE | Facility: PHYSICIAN GROUP | Age: 40
End: 2020-03-01
Payer: COMMERCIAL

## 2020-03-01 VITALS
OXYGEN SATURATION: 96 % | DIASTOLIC BLOOD PRESSURE: 78 MMHG | HEIGHT: 61 IN | SYSTOLIC BLOOD PRESSURE: 120 MMHG | WEIGHT: 178 LBS | RESPIRATION RATE: 18 BRPM | HEART RATE: 116 BPM | BODY MASS INDEX: 33.61 KG/M2 | TEMPERATURE: 99 F

## 2020-03-01 DIAGNOSIS — Z20.828 EXPOSURE TO INFLUENZA: ICD-10-CM

## 2020-03-01 DIAGNOSIS — J11.1 INFLUENZA-LIKE ILLNESS: ICD-10-CM

## 2020-03-01 LAB
FLUAV+FLUBV AG SPEC QL IA: NORMAL
INT CON NEG: NEGATIVE
INT CON POS: POSITIVE

## 2020-03-01 PROCEDURE — 87804 INFLUENZA ASSAY W/OPTIC: CPT | Performed by: NURSE PRACTITIONER

## 2020-03-01 PROCEDURE — 99214 OFFICE O/P EST MOD 30 MIN: CPT | Performed by: NURSE PRACTITIONER

## 2020-03-01 RX ORDER — OSELTAMIVIR PHOSPHATE 75 MG/1
75 CAPSULE ORAL 2 TIMES DAILY
Qty: 10 CAP | Refills: 0 | Status: SHIPPED | OUTPATIENT
Start: 2020-03-01 | End: 2020-09-27

## 2020-03-01 ASSESSMENT — COPD QUESTIONNAIRES: COPD: 0

## 2020-03-01 ASSESSMENT — ENCOUNTER SYMPTOMS
COUGH: 1
CHILLS: 1
RHINORRHEA: 1
MYALGIAS: 1

## 2020-03-01 ASSESSMENT — FIBROSIS 4 INDEX: FIB4 SCORE: 0.5

## 2020-03-01 NOTE — PROGRESS NOTES
Subjective:      Ceci Phillips is a 39 y.o. female who presents with Cough (bodyaches, headache, chills, ( dx with flu on friday) )            Cough   This is a new problem. Episode onset: pt reports new onset of cough, body aches, headache, chills and fevers that started yesterday. Reports her  was dx with flu 2 days ago. She started having symptoms a day later.  The problem has been unchanged. The cough is non-productive. Associated symptoms include chills, myalgias, nasal congestion and rhinorrhea. She has tried OTC cough suppressant and rest for the symptoms. The treatment provided mild relief. There is no history of asthma or COPD.       Review of Systems   Constitutional: Positive for chills.   HENT: Positive for congestion and rhinorrhea.    Respiratory: Positive for cough.    Musculoskeletal: Positive for myalgias.   All other systems reviewed and are negative.    Past Medical History:   Diagnosis Date   • Anxiety    • ASTHMA    • Depression    • Tuberculosis     Treated-Postive PPT   • Urinary tract infection, site not specified       Past Surgical History:   Procedure Laterality Date   • CHOLECYSTECTOMY     • DENTAL EXTRACTION(S)     • PRIMARY C SECTION      x 2      Social History     Socioeconomic History   • Marital status: Single     Spouse name: Not on file   • Number of children: Not on file   • Years of education: Not on file   • Highest education level: Not on file   Occupational History   • Not on file   Social Needs   • Financial resource strain: Not on file   • Food insecurity     Worry: Not on file     Inability: Not on file   • Transportation needs     Medical: Not on file     Non-medical: Not on file   Tobacco Use   • Smoking status: Never Smoker   • Smokeless tobacco: Never Used   Substance and Sexual Activity   • Alcohol use: Yes     Alcohol/week: 0.0 oz     Comment: occasionally   • Drug use: No   • Sexual activity: Yes     Partners: Male     Birth control/protection: I.U.D.  "  Lifestyle   • Physical activity     Days per week: Not on file     Minutes per session: Not on file   • Stress: Not on file   Relationships   • Social connections     Talks on phone: Not on file     Gets together: Not on file     Attends Spiritism service: Not on file     Active member of club or organization: Not on file     Attends meetings of clubs or organizations: Not on file     Relationship status: Not on file   • Intimate partner violence     Fear of current or ex partner: Not on file     Emotionally abused: Not on file     Physically abused: Not on file     Forced sexual activity: Not on file   Other Topics Concern   • Not on file   Social History Narrative   • Not on file          Objective:     /78 (BP Location: Right arm, Patient Position: Sitting, BP Cuff Size: Adult)   Pulse (!) 116   Temp 37.2 °C (99 °F) (Tympanic)   Resp 18   Ht 1.549 m (5' 1\")   Wt 80.7 kg (178 lb)   SpO2 96%   Breastfeeding No   BMI 33.63 kg/m²      Physical Exam  Vitals signs and nursing note reviewed.   Constitutional:       Appearance: Normal appearance. She is normal weight. She is ill-appearing.   HENT:      Head: Normocephalic and atraumatic.      Nose: Congestion and rhinorrhea present.      Mouth/Throat:      Mouth: Mucous membranes are moist.      Pharynx: Oropharynx is clear.   Eyes:      Extraocular Movements: Extraocular movements intact.      Pupils: Pupils are equal, round, and reactive to light.   Neck:      Musculoskeletal: Normal range of motion.   Cardiovascular:      Rate and Rhythm: Normal rate and regular rhythm.   Pulmonary:      Effort: Pulmonary effort is normal.   Musculoskeletal: Normal range of motion.   Lymphadenopathy:      Head:      Right side of head: Submandibular adenopathy present.      Left side of head: Submandibular adenopathy present.   Skin:     General: Skin is warm and dry.      Capillary Refill: Capillary refill takes less than 2 seconds.   Neurological:      General: No " focal deficit present.      Mental Status: She is alert and oriented to person, place, and time. Mental status is at baseline.   Psychiatric:         Mood and Affect: Mood normal.         Speech: Speech normal.         Thought Content: Thought content normal.         Judgment: Judgment normal.                 Assessment/Plan:       1. Exposure to influenza  - POCT Influenza A/B NEGATIVE    2. Influenza-like illness  - oseltamivir (TAMIFLU) 75 MG Cap; Take 1 Cap by mouth 2 times a day.  Dispense: 10 Cap; Refill: 0    Given her recent exposure and similar sxs, will go ahead and treat. She agrees   Continue OTC cough syrup as directed  Encouraged rest, fluids and supportive care  Work note provided  Red flags discussed and when to return for additional care  She understands and agrees  Supportive care, differential diagnoses, and indications for immediate follow-up discussed with patient.    Pathogenesis of diagnosis discussed including typical length and natural progression.      Instructed to return to  or nearest emergency department if symptoms fail to improve, for any change in condition, further concerns, or new concerning symptoms.  Patient states understanding of the plan of care and discharge instructions.

## 2020-03-01 NOTE — LETTER
March 1, 2020         Patient: Ceci Phillips   YOB: 1980   Date of Visit: 3/1/2020           To Whom it May Concern:    Ceci Phillips was seen in my clinic on 3/1/2020. Please excuse her from work 3/2/20-3/3/20.        Sincerely,           EJ Post.  Electronically Signed

## 2020-03-02 ENCOUNTER — OFFICE VISIT (OUTPATIENT)
Dept: URGENT CARE | Facility: CLINIC | Age: 40
End: 2020-03-02
Payer: COMMERCIAL

## 2020-03-02 DIAGNOSIS — H92.02 LEFT EAR PAIN: ICD-10-CM

## 2020-03-02 PROCEDURE — 99214 OFFICE O/P EST MOD 30 MIN: CPT | Performed by: NURSE PRACTITIONER

## 2020-03-03 VITALS
OXYGEN SATURATION: 98 % | HEIGHT: 61 IN | DIASTOLIC BLOOD PRESSURE: 76 MMHG | HEART RATE: 85 BPM | TEMPERATURE: 98.1 F | BODY MASS INDEX: 33.61 KG/M2 | WEIGHT: 178 LBS | SYSTOLIC BLOOD PRESSURE: 100 MMHG | RESPIRATION RATE: 14 BRPM

## 2020-03-03 RX ORDER — PREDNISONE 20 MG/1
40 TABLET ORAL DAILY
Qty: 10 TAB | Refills: 0 | Status: SHIPPED | OUTPATIENT
Start: 2020-03-03 | End: 2020-03-08

## 2020-03-03 ASSESSMENT — ENCOUNTER SYMPTOMS
SINUS PAIN: 0
DOUBLE VISION: 0
CHILLS: 0
DIARRHEA: 0
HEADACHES: 0
FEVER: 0
MYALGIAS: 0
NAUSEA: 0
PALPITATIONS: 0
COUGH: 1
NECK PAIN: 0
VOMITING: 0
SPUTUM PRODUCTION: 1
DIZZINESS: 0
WHEEZING: 0
BLURRED VISION: 0
SORE THROAT: 1
SHORTNESS OF BREATH: 0

## 2020-03-03 ASSESSMENT — FIBROSIS 4 INDEX: FIB4 SCORE: 0.5

## 2020-03-03 NOTE — PATIENT INSTRUCTIONS
"Influenza, Adult  Influenza (“the flu\") is an infection in the lungs, nose, and throat (respiratory tract). It is caused by a virus. The flu causes many common cold symptoms, as well as a high fever and body aches. It can make you feel very sick.  The flu spreads easily from person to person (is contagious). Getting a flu shot (influenza vaccination) every year is the best way to prevent the flu.  Follow these instructions at home:  · Take over-the-counter and prescription medicines only as told by your doctor.  · Use a cool mist humidifier to add moisture (humidity) to the air in your home. This can make it easier to breathe.  · Rest as needed.  · Drink enough fluid to keep your pee (urine) clear or pale yellow.  · Cover your mouth and nose when you cough or sneeze.  · Wash your hands with soap and water often, especially after you cough or sneeze. If you cannot use soap and water, use hand .  · Stay home from work or school as told by your doctor. Unless you are visiting your doctor, try to avoid leaving home until your fever has been gone for 24 hours without the use of medicine.  · Keep all follow-up visits as told by your doctor. This is important.  How is this prevented?  · Getting a yearly (annual) flu shot is the best way to avoid getting the flu. You may get the flu shot in late summer, fall, or winter. Ask your doctor when you should get your flu shot.  · Wash your hands often or use hand  often.  · Avoid contact with people who are sick during cold and flu season.  · Eat healthy foods.  · Drink plenty of fluids.  · Get enough sleep.  · Exercise regularly.  Contact a doctor if:  · You get new symptoms.  · You have:  ¨ Chest pain.  ¨ Watery poop (diarrhea).  ¨ A fever.  · Your cough gets worse.  · You start to have more mucus.  · You feel sick to your stomach (nauseous).  · You throw up (vomit).  Get help right away if:  · You start to be short of breath or have trouble breathing.  · Your " skin or nails turn a bluish color.  · You have very bad pain or stiffness in your neck.  · You get a sudden headache.  · You get sudden pain in your face or ear.  · You cannot stop throwing up.  This information is not intended to replace advice given to you by your health care provider. Make sure you discuss any questions you have with your health care provider.  Document Released: 09/26/2009 Document Revised: 05/25/2017 Document Reviewed: 10/11/2016  [employer] Interactive Patient Education © 2017 [employer]  Earache, Adult  An earache, or ear pain, can be caused by many things, including:  · An infection.  · Ear wax buildup.  · Ear pressure.  · Something in the ear that should not be there (foreign body).  · A sore throat.  · Tooth problems.  · Jaw problems.  Treatment of the earache will depend on the cause. If the cause is not clear or cannot be determined, you may need to watch your symptoms until your earache goes away or until a cause is found.  Follow these instructions at home:  Pay attention to any changes in your symptoms. Take these actions to help with your pain:  · Take or apply over-the-counter and prescription medicines only as told by your health care provider.  · If you were prescribed an antibiotic medicine, use it as told by your health care provider. Do not stop using the antibiotic even if you start to feel better.  · Do not put anything in your ear other than medicine that is prescribed by your health care provider.  · If directed, apply heat to the affected area as often as told by your health care provider. Use the heat source that your health care provider recommends, such as a moist heat pack or a heating pad.  ¨ Place a towel between your skin and the heat source.  ¨ Leave the heat on for 20-30 minutes.  ¨ Remove the heat if your skin turns bright red. This is especially important if you are unable to feel pain, heat, or cold. You may have a greater risk of getting burned.  · If  directed, put ice on the ear:  ¨ Put ice in a plastic bag.  ¨ Place a towel between your skin and the bag.  ¨ Leave the ice on for 20 minutes, 2-3 times a day.  · Try resting in an upright position instead of lying down. This may help to reduce pressure in your ear and relieve pain.  · Chew gum if it helps to relieve your ear pain.  · Treat any allergies as told by your health care provider.  · Keep all follow-up visits as told by your health care provider. This is important.  Contact a health care provider if:  · Your pain does not improve within 2 days.  · Your earache gets worse.  · You have new symptoms.  · You have a fever.  Get help right away if:  · You have a severe headache.  · You have a stiff neck.  · You have trouble swallowing.  · You have redness or swelling behind your ear.  · You have fluid or blood coming from your ear.  · You have hearing loss.  · You feel dizzy.  This information is not intended to replace advice given to you by your health care provider. Make sure you discuss any questions you have with your health care provider.  Document Released: 08/04/2005 Document Revised: 08/15/2017 Document Reviewed: 06/12/2017  [employer] Interactive Patient Education © 2017 [employer]

## 2020-03-03 NOTE — PROGRESS NOTES
Subjective:     Ceci Phillips is a 39 y.o. female who presents for Otalgia ((L) jaw pain x today )      Otalgia    Associated symptoms include coughing and a sore throat. Pertinent negatives include no diarrhea, ear discharge, headaches, hearing loss, neck pain, rash or vomiting.     Pt presents for evaluation of a new problem. She was seen in the office yesterday with complaints of flu like symptoms. Her  tested positive for the flu so she was started on Tamiflu. Today she complains of severe left sided ear pain. Her current pain level is an 8/10. She denies ringing in her ears, hearing loss or drainage. She has been using Ibuprofen for relief of her pain which has provided minor comfort.     Review of Systems   Constitutional: Positive for malaise/fatigue. Negative for chills and fever.   HENT: Positive for congestion, ear pain and sore throat. Negative for ear discharge, hearing loss, sinus pain and tinnitus.    Eyes: Negative for blurred vision and double vision.   Respiratory: Positive for cough and sputum production. Negative for shortness of breath and wheezing.    Cardiovascular: Negative for chest pain and palpitations.   Gastrointestinal: Negative for diarrhea, nausea and vomiting.   Musculoskeletal: Negative for myalgias and neck pain.   Skin: Negative for rash.   Neurological: Negative for dizziness and headaches.   All other systems reviewed and are negative.      PMH:   Past Medical History:   Diagnosis Date   • Anxiety    • ASTHMA    • Depression    • Tuberculosis     Treated-Postive PPT   • Urinary tract infection, site not specified      ALLERGIES:   Allergies   Allergen Reactions   • Pcn [Penicillins]      SURGHX:   Past Surgical History:   Procedure Laterality Date   • CHOLECYSTECTOMY     • DENTAL EXTRACTION(S)     • PRIMARY C SECTION      x 2     SOCHX:   Social History     Socioeconomic History   • Marital status: Single     Spouse name: Not on file   • Number of children: Not on file   •  "Years of education: Not on file   • Highest education level: Not on file   Occupational History   • Not on file   Social Needs   • Financial resource strain: Not on file   • Food insecurity     Worry: Not on file     Inability: Not on file   • Transportation needs     Medical: Not on file     Non-medical: Not on file   Tobacco Use   • Smoking status: Never Smoker   • Smokeless tobacco: Never Used   Substance and Sexual Activity   • Alcohol use: Yes     Alcohol/week: 0.0 oz     Comment: occasionally   • Drug use: No   • Sexual activity: Yes     Partners: Male     Birth control/protection: I.U.D.   Lifestyle   • Physical activity     Days per week: Not on file     Minutes per session: Not on file   • Stress: Not on file   Relationships   • Social connections     Talks on phone: Not on file     Gets together: Not on file     Attends Worship service: Not on file     Active member of club or organization: Not on file     Attends meetings of clubs or organizations: Not on file     Relationship status: Not on file   • Intimate partner violence     Fear of current or ex partner: Not on file     Emotionally abused: Not on file     Physically abused: Not on file     Forced sexual activity: Not on file   Other Topics Concern   • Not on file   Social History Narrative   • Not on file     FH:   Family History   Problem Relation Age of Onset   • Arthritis Mother    • Cancer Father         Brain   • Stroke Father    • Diabetes Maternal Grandmother    • Heart Disease Maternal Grandmother    • Hypertension Maternal Grandmother    • Hyperlipidemia Maternal Grandmother    • Diabetes Maternal Grandfather    • Heart Disease Maternal Grandfather    • Hypertension Maternal Grandfather    • Hyperlipidemia Maternal Grandfather          Objective:   /76   Pulse 85   Temp 36.7 °C (98.1 °F)   Resp 14   Ht 1.549 m (5' 1\")   Wt 80.7 kg (178 lb)   SpO2 98%   BMI 33.63 kg/m²     Physical Exam  Vitals signs and nursing note reviewed. "   Constitutional:       General: She is not in acute distress.     Appearance: She is ill-appearing. She is not toxic-appearing.   HENT:      Head: Normocephalic and atraumatic.      Right Ear: Hearing, tympanic membrane, ear canal and external ear normal. No swelling or tenderness. There is no impacted cerumen. Tympanic membrane is not injected, erythematous or bulging.      Left Ear: Hearing, ear canal and external ear normal. Tenderness present. No swelling. There is no impacted cerumen. Tympanic membrane is injected. Tympanic membrane is not erythematous or bulging.      Nose: Congestion and rhinorrhea present.      Mouth/Throat:      Mouth: Mucous membranes are moist.      Pharynx: Posterior oropharyngeal erythema present. No oropharyngeal exudate.   Eyes:      General:         Right eye: No discharge.         Left eye: No discharge.      Extraocular Movements: Extraocular movements intact.      Conjunctiva/sclera: Conjunctivae normal.      Pupils: Pupils are equal, round, and reactive to light.   Neck:      Musculoskeletal: Normal range of motion. Muscular tenderness present.   Cardiovascular:      Rate and Rhythm: Tachycardia present.      Heart sounds: Normal heart sounds. No murmur.   Pulmonary:      Effort: Pulmonary effort is normal.      Breath sounds: Normal breath sounds.   Abdominal:      General: Abdomen is flat. There is no distension.      Palpations: Abdomen is soft.   Musculoskeletal: Normal range of motion.   Lymphadenopathy:      Cervical: Cervical adenopathy present.   Skin:     General: Skin is warm and dry.      Capillary Refill: Capillary refill takes less than 2 seconds.   Neurological:      General: No focal deficit present.      Mental Status: She is alert and oriented to person, place, and time. Mental status is at baseline.   Psychiatric:         Mood and Affect: Mood normal.         Behavior: Behavior normal.         Thought Content: Thought content normal.         Judgment: Judgment  normal.         Assessment/Plan:   Assessment      1. Left ear pain  - predniSONE (DELTASONE) 20 MG Tab; Take 2 Tabs by mouth every day for 5 days.  Dispense: 10 Tab; Refill: 0    Prescription called in to preferred pharmacy. Her ear pain is likely due to her viral illness at this time. She was instructed to use Prednisone in the am and to avoid NSAIDS while using prescription.  Red flags discussed on when to seek treatment back in UC or ER including loss of hearing, discharge from the ears or worsening symptoms. She is in agreement with her plan of care.

## 2020-03-10 ENCOUNTER — TELEPHONE (OUTPATIENT)
Dept: HEALTH INFORMATION MANAGEMENT | Facility: OTHER | Age: 40
End: 2020-03-10

## 2020-03-10 NOTE — TELEPHONE ENCOUNTER
1. Caller Name: Ceci                        Call Back Number: [phone]  [facility] PCP or Specialty Provider: Yes Miles Connell        2.  Does patient have any active symptoms of respiratory illness (fever OR cough OR shortness of breath)? Yes, the patient reports the following respiratory symptoms: cough x 10 days. Both dry and productive worse at night. Taking OTC Rubitussin, theraflu.   No fever. Was exposed to flu and took Tamiflu.     3.  In the last 30 days, has the patient traveled outside of the country OR in a high risk area within the [place] OR have any known contact with someone who has?  No.    4.  Does patient have any comoribidities? None     5. Disposition:  Advised to perform self care, monitor for worsening symptoms and to call back in 3 days if no improvement. Patient verbalized agreement and understanding.     Note routed to PCP: MICHAEL only.

## 2020-09-27 ENCOUNTER — OFFICE VISIT (OUTPATIENT)
Dept: URGENT CARE | Facility: PHYSICIAN GROUP | Age: 40
End: 2020-09-27
Payer: COMMERCIAL

## 2020-09-27 VITALS
TEMPERATURE: 97.8 F | OXYGEN SATURATION: 96 % | SYSTOLIC BLOOD PRESSURE: 110 MMHG | HEIGHT: 61 IN | HEART RATE: 84 BPM | BODY MASS INDEX: 32.1 KG/M2 | WEIGHT: 170 LBS | RESPIRATION RATE: 16 BRPM | DIASTOLIC BLOOD PRESSURE: 78 MMHG

## 2020-09-27 DIAGNOSIS — R05.9 COUGH: ICD-10-CM

## 2020-09-27 PROCEDURE — 99214 OFFICE O/P EST MOD 30 MIN: CPT | Performed by: NURSE PRACTITIONER

## 2020-09-27 RX ORDER — DEXTROMETHORPHAN HYDROBROMIDE AND PROMETHAZINE HYDROCHLORIDE 15; 6.25 MG/5ML; MG/5ML
5 SYRUP ORAL EVERY 4 HOURS PRN
Qty: 100 ML | Refills: 0 | Status: SHIPPED | OUTPATIENT
Start: 2020-09-27

## 2020-09-27 RX ORDER — BENZONATATE 100 MG/1
100 CAPSULE ORAL 3 TIMES DAILY PRN
Qty: 60 CAP | Refills: 0 | Status: SHIPPED | OUTPATIENT
Start: 2020-09-27

## 2020-09-27 ASSESSMENT — ENCOUNTER SYMPTOMS
FEVER: 0
SINUS PAIN: 0
SPUTUM PRODUCTION: 0
DIARRHEA: 0
SORE THROAT: 1
SHORTNESS OF BREATH: 0
WHEEZING: 0
ABDOMINAL PAIN: 0
HEADACHES: 1
NAUSEA: 0
HEARTBURN: 0
COUGH: 1
VOMITING: 1
CHILLS: 0

## 2020-09-27 NOTE — PROGRESS NOTES
Subjective:   Ceci Phillips  is a 40 y.o. female who presents for Cough (x wednesday )        HPI   40-year-old female patient reports urgent care for new problem that started 4 days ago.  Patient states she has an unproductive cough causing her to throw up.  Patient states she has a feeling of fluid running down the back of her throat.  Denies seasonal allergies and is currently not taking anything over-the-counter for them but is taking TheraFlu and teas with no relief of symptoms.  Patient denies fever, chills, nausea, vomiting, change in taste or smell or difficulty breathing    Review of Systems   Constitutional: Negative for chills, fever and malaise/fatigue.   HENT: Positive for sore throat. Negative for congestion, ear pain and sinus pain.    Respiratory: Positive for cough. Negative for sputum production, shortness of breath and wheezing.    Gastrointestinal: Positive for vomiting. Negative for abdominal pain, diarrhea, heartburn and nausea.   Skin: Negative for itching and rash.   Neurological: Positive for headaches.     Past Medical History:   Diagnosis Date   • Anxiety    • ASTHMA    • Depression    • Tuberculosis     Treated-Postive PPT   • Urinary tract infection, site not specified       Past Surgical History:   Procedure Laterality Date   • CHOLECYSTECTOMY     • DENTAL EXTRACTION(S)     • PRIMARY C SECTION      x 2      Social History     Socioeconomic History   • Marital status: Single     Spouse name: Not on file   • Number of children: Not on file   • Years of education: Not on file   • Highest education level: Not on file   Occupational History   • Not on file   Social Needs   • Financial resource strain: Not on file   • Food insecurity     Worry: Not on file     Inability: Not on file   • Transportation needs     Medical: Not on file     Non-medical: Not on file   Tobacco Use   • Smoking status: Never Smoker   • Smokeless tobacco: Never Used   Substance and Sexual Activity   • Alcohol use: Yes  "    Alcohol/week: 0.0 oz     Comment: occasionally   • Drug use: No   • Sexual activity: Yes     Partners: Male     Birth control/protection: I.U.D.   Lifestyle   • Physical activity     Days per week: Not on file     Minutes per session: Not on file   • Stress: Not on file   Relationships   • Social connections     Talks on phone: Not on file     Gets together: Not on file     Attends Rastafarian service: Not on file     Active member of club or organization: Not on file     Attends meetings of clubs or organizations: Not on file     Relationship status: Not on file   • Intimate partner violence     Fear of current or ex partner: Not on file     Emotionally abused: Not on file     Physically abused: Not on file     Forced sexual activity: Not on file   Other Topics Concern   • Not on file   Social History Narrative   • Not on file    Pcn [penicillins]       Objective:   /78 (BP Location: Right arm, Patient Position: Sitting, BP Cuff Size: Adult)   Pulse 84   Temp 36.6 °C (97.8 °F) (Tympanic)   Resp 16   Ht 1.549 m (5' 1\")   Wt 77.1 kg (170 lb)   SpO2 96%   Breastfeeding No   BMI 32.12 kg/m²   Physical Exam  Vitals signs reviewed.   Constitutional:       Appearance: Normal appearance.   HENT:      Right Ear: Ear canal and external ear normal.      Left Ear: Ear canal and external ear normal.      Ears:      Comments: Bilateral KAYLA     Nose: Rhinorrhea present.      Mouth/Throat:      Mouth: Mucous membranes are moist.      Comments: Post nasal drip noted  Cardiovascular:      Rate and Rhythm: Normal rate and regular rhythm.      Pulses: Normal pulses.      Heart sounds: Normal heart sounds.   Pulmonary:      Effort: Pulmonary effort is normal.      Breath sounds: Normal breath sounds.   Skin:     General: Skin is warm.   Neurological:      Mental Status: She is alert and oriented to person, place, and time.   Psychiatric:         Mood and Affect: Mood normal.         Behavior: Behavior normal.         " Thought Content: Thought content normal.         Judgment: Judgment normal.           Assessment/Plan:     1. Cough  benzonatate (TESSALON) 100 MG Cap    promethazine-dextromethorphan (PROMETHAZINE-DM) 6.25-15 MG/5ML syrup     Discussed physical examination findings as patient presentation is consistent with postnasal drip and sinusitis.  Will provide patient with Tessalon Perles and promethazine for cough.  Discussed sedating effects of cough syrup.  Discussed starting Flonase as well as Claritin.     Supportive care, differential diagnoses, and indications for immediate follow-up discussed with patient.    Pathogenesis of diagnosis discussed including typical length and natural progression. Patient expresses understanding and agrees to plan.    Instructed patient to return to clinic for worsening symptoms or symptoms that persist for 7 to 10 days     Please note that this dictation was created using voice recognition software. I have made every reasonable attempt to correct obvious errors, but I expect that there are errors of grammar and possibly content that I did not discover before finalizing the note.    Note electronically signed by MARCOS Pate

## 2021-01-05 ENCOUNTER — HOSPITAL ENCOUNTER (OUTPATIENT)
Facility: MEDICAL CENTER | Age: 41
End: 2021-01-05
Attending: NURSE PRACTITIONER
Payer: COMMERCIAL

## 2021-01-05 ENCOUNTER — OFFICE VISIT (OUTPATIENT)
Dept: URGENT CARE | Facility: PHYSICIAN GROUP | Age: 41
End: 2021-01-05
Payer: COMMERCIAL

## 2021-01-05 VITALS
SYSTOLIC BLOOD PRESSURE: 120 MMHG | BODY MASS INDEX: 32.1 KG/M2 | DIASTOLIC BLOOD PRESSURE: 86 MMHG | HEIGHT: 61 IN | RESPIRATION RATE: 16 BRPM | HEART RATE: 71 BPM | OXYGEN SATURATION: 97 % | TEMPERATURE: 97 F | WEIGHT: 170 LBS

## 2021-01-05 DIAGNOSIS — R05.9 COUGH: ICD-10-CM

## 2021-01-05 DIAGNOSIS — R07.89 FEELING OF CHEST TIGHTNESS: ICD-10-CM

## 2021-01-05 DIAGNOSIS — Z20.822 EXPOSURE TO COVID-19 VIRUS: ICD-10-CM

## 2021-01-05 PROCEDURE — 99214 OFFICE O/P EST MOD 30 MIN: CPT | Performed by: NURSE PRACTITIONER

## 2021-01-05 PROCEDURE — U0005 INFEC AGEN DETEC AMPLI PROBE: HCPCS

## 2021-01-05 PROCEDURE — 93000 ELECTROCARDIOGRAM COMPLETE: CPT | Performed by: NURSE PRACTITIONER

## 2021-01-05 PROCEDURE — U0003 INFECTIOUS AGENT DETECTION BY NUCLEIC ACID (DNA OR RNA); SEVERE ACUTE RESPIRATORY SYNDROME CORONAVIRUS 2 (SARS-COV-2) (CORONAVIRUS DISEASE [COVID-19]), AMPLIFIED PROBE TECHNIQUE, MAKING USE OF HIGH THROUGHPUT TECHNOLOGIES AS DESCRIBED BY CMS-2020-01-R: HCPCS

## 2021-01-05 ASSESSMENT — ENCOUNTER SYMPTOMS
FEVER: 0
SENSORY CHANGE: 0
COUGH: 1
SORE THROAT: 0
MYALGIAS: 0
HEADACHES: 0
BACK PAIN: 0
CHILLS: 0
SHORTNESS OF BREATH: 0
TINGLING: 0
ORTHOPNEA: 0
SINUS PAIN: 0
WHEEZING: 0
SPUTUM PRODUCTION: 0
PALPITATIONS: 0
DIZZINESS: 0
WEAKNESS: 0

## 2021-01-06 DIAGNOSIS — Z20.822 EXPOSURE TO COVID-19 VIRUS: ICD-10-CM

## 2021-01-06 DIAGNOSIS — R07.89 FEELING OF CHEST TIGHTNESS: ICD-10-CM

## 2021-01-06 DIAGNOSIS — R05.9 COUGH: ICD-10-CM

## 2021-01-06 LAB
COVID ORDER STATUS COVID19: NORMAL
SARS-COV-2 RNA RESP QL NAA+PROBE: NOTDETECTED
SPECIMEN SOURCE: NORMAL

## 2021-01-06 NOTE — PROGRESS NOTES
Subjective:      Ceci Phillips is a 40 y.o. female who presents with Chest Pressure (x2 days)            HPI  States has had chest pressure in upper mid chest region x 2 days. No known expsoure to COVID. States mild cough without shortness of breath, no nasal congestion or runny nose. Denies fever, n/v/d, abdominal pain. H/o childhood asthma, but has had no problems. 12/22/20, son was quarantined for COVID from family. Denies anxiety or GERD. Had recent visit with PCP in 10/20. Denies thyroid issues.     PMH:  has a past medical history of Anxiety, ASTHMA, Depression, Tuberculosis, and Urinary tract infection, site not specified. She also has no past medical history of Allergy, Diabetes, GERD (gastroesophageal reflux disease), Headache(784.0), Hyperlipidemia, Hypertension, Kidney disease, Seizure (HCC), Stroke ([facility]), or Substance abuse ([facility]).  MEDS:   Current Outpatient Medications:   •  benzonatate (TESSALON) 100 MG Cap, Take 1 Cap by mouth 3 times a day as needed for Cough. (Patient not taking: Reported on 1/5/2021), Disp: 60 Cap, Rfl: 0  •  promethazine-dextromethorphan (PROMETHAZINE-DM) 6.25-15 MG/5ML syrup, Take 5 mL by mouth every four hours as needed for Cough. (Patient not taking: Reported on 1/5/2021), Disp: 100 mL, Rfl: 0  •  ibuprofen (MOTRIN) 800 MG Tab, Take 1 Tab by mouth every 8 hours as needed. (Patient not taking: Reported on 1/5/2021), Disp: 60 Tab, Rfl: 0  •  Cholecalciferol (VITAMIN D PO), Take  by mouth., Disp: , Rfl:   ALLERGIES:   Allergies   Allergen Reactions   • Pcn [Penicillins]      SURGHX:   Past Surgical History:   Procedure Laterality Date   • CHOLECYSTECTOMY     • DENTAL EXTRACTION(S)     • PRIMARY C SECTION      x 2     SOCHX:  reports that she has never smoked. She has never used smokeless tobacco. She reports current alcohol use. She reports that she does not use drugs.  FH: Family history was reviewed, no pertinent findings to report    Review of Systems   Constitutional: Negative  "for chills, fever and malaise/fatigue.   HENT: Negative for congestion, ear pain, sinus pain and sore throat.    Respiratory: Positive for cough. Negative for sputum production, shortness of breath and wheezing.    Cardiovascular: Positive for chest pain. Negative for palpitations and orthopnea.        Chest tightness in upper mid chest at throat region.    Musculoskeletal: Negative for back pain and myalgias.   Skin: Negative for itching and rash.   Neurological: Negative for dizziness, tingling, sensory change, weakness and headaches.   Endo/Heme/Allergies: Negative for environmental allergies.   All other systems reviewed and are negative.         Objective:     /86 (BP Location: Left arm, Patient Position: Sitting, BP Cuff Size: Adult)   Pulse 71   Temp 36.1 °C (97 °F) (Temporal)   Resp 16   Ht 1.549 m (5' 1\")   Wt 77.1 kg (170 lb)   SpO2 97%   BMI 32.12 kg/m²      Physical Exam  Vitals signs reviewed.   Constitutional:       General: She is awake. She is not in acute distress.     Appearance: Normal appearance. She is well-developed. She is not ill-appearing, toxic-appearing or diaphoretic.   HENT:      Head: Normocephalic.   Eyes:      General:         Right eye: No discharge.         Left eye: No discharge.      Conjunctiva/sclera: Conjunctivae normal.      Pupils: Pupils are equal, round, and reactive to light.   Neck:      Musculoskeletal: Normal range of motion.      Thyroid: No thyroid mass, thyromegaly or thyroid tenderness.      Trachea: Trachea normal.   Cardiovascular:      Rate and Rhythm: Normal rate and regular rhythm.      Heart sounds: Normal heart sounds, S1 normal and S2 normal.   Pulmonary:      Effort: Pulmonary effort is normal. No tachypnea, accessory muscle usage or respiratory distress.      Breath sounds: Normal breath sounds and air entry. No stridor, decreased air movement or transmitted upper airway sounds. No decreased breath sounds, wheezing, rhonchi or rales. "   Abdominal:      General: Bowel sounds are normal. There is no distension.      Palpations: Abdomen is soft.      Tenderness: There is no abdominal tenderness. There is no guarding or rebound.   Musculoskeletal: Normal range of motion.   Lymphadenopathy:      Cervical: No cervical adenopathy.   Skin:     General: Skin is warm and dry.   Neurological:      Mental Status: She is alert and oriented to person, place, and time.      GCS: GCS eye subscore is 4. GCS verbal subscore is 5. GCS motor subscore is 6.   Psychiatric:         Attention and Perception: Attention normal.         Mood and Affect: Mood and affect normal.         Speech: Speech normal.         Behavior: Behavior normal. Behavior is cooperative.                 Assessment/Plan:        1. Feeling of chest tightness    - EKG - Clinic Performed: NSR  - COVID/SARS COV-2 PCR; Future    2. Exposure to COVID-19 virus    - COVID/SARS COV-2 PCR; Future    3. Cough    - COVID/SARS COV-2 PCR; Future    Increase water intake  May use Tylenol prn for fever or body aches  Get rest  Salt water gargle prn  Flonase, saline nasal spray prn for nasal congestion  May use OTC cough suppressant medications like plain Robitussin/Delsym prn  OTC Immodium prn for any diarrhea with diet modification to clear/bland diet  Monitor for fevers, worse cough, shortness of breath, CP, chest tightness, sinus problems- need re-evaluation  COVID tip sheet given   F/u with PCP for possible thyroid check  MyChart release for result, may take up to 72 hrs for result, patient notified

## 2021-01-08 ENCOUNTER — HOSPITAL ENCOUNTER (OUTPATIENT)
Dept: LAB | Facility: MEDICAL CENTER | Age: 41
End: 2021-01-08
Attending: NURSE PRACTITIONER
Payer: COMMERCIAL

## 2021-01-08 LAB — COVID ORDER STATUS COVID19: NORMAL

## 2021-01-08 PROCEDURE — C9803 HOPD COVID-19 SPEC COLLECT: HCPCS

## 2021-01-08 PROCEDURE — U0003 INFECTIOUS AGENT DETECTION BY NUCLEIC ACID (DNA OR RNA); SEVERE ACUTE RESPIRATORY SYNDROME CORONAVIRUS 2 (SARS-COV-2) (CORONAVIRUS DISEASE [COVID-19]), AMPLIFIED PROBE TECHNIQUE, MAKING USE OF HIGH THROUGHPUT TECHNOLOGIES AS DESCRIBED BY CMS-2020-01-R: HCPCS

## 2021-01-08 PROCEDURE — U0005 INFEC AGEN DETEC AMPLI PROBE: HCPCS

## 2021-01-09 LAB
SARS-COV-2 RNA RESP QL NAA+PROBE: NOTDETECTED
SPECIMEN SOURCE: NORMAL

## 2024-04-20 ENCOUNTER — HOSPITAL ENCOUNTER (OUTPATIENT)
Dept: LAB | Facility: MEDICAL CENTER | Age: 44
End: 2024-04-20
Attending: NURSE PRACTITIONER
Payer: COMMERCIAL

## 2024-04-20 LAB
25(OH)D3 SERPL-MCNC: 11 NG/ML (ref 30–100)
ABO GROUP BLD: NORMAL
ALBUMIN SERPL BCP-MCNC: 4.6 G/DL (ref 3.2–4.9)
ALBUMIN/GLOB SERPL: 1.8 G/DL
ALP SERPL-CCNC: 87 U/L (ref 30–99)
ALT SERPL-CCNC: 31 U/L (ref 2–50)
ANION GAP SERPL CALC-SCNC: 11 MMOL/L (ref 7–16)
AST SERPL-CCNC: 16 U/L (ref 12–45)
BASOPHILS # BLD AUTO: 0.9 % (ref 0–1.8)
BASOPHILS # BLD: 0.06 K/UL (ref 0–0.12)
BILIRUB SERPL-MCNC: 0.3 MG/DL (ref 0.1–1.5)
BUN SERPL-MCNC: 15 MG/DL (ref 8–22)
CALCIUM ALBUM COR SERPL-MCNC: 8.8 MG/DL (ref 8.5–10.5)
CALCIUM SERPL-MCNC: 9.3 MG/DL (ref 8.5–10.5)
CHLORIDE SERPL-SCNC: 104 MMOL/L (ref 96–112)
CHOLEST SERPL-MCNC: 142 MG/DL (ref 100–199)
CO2 SERPL-SCNC: 21 MMOL/L (ref 20–33)
CREAT SERPL-MCNC: 0.46 MG/DL (ref 0.5–1.4)
CRP SERPL HS-MCNC: <0.3 MG/DL (ref 0–0.75)
EOSINOPHIL # BLD AUTO: 0.17 K/UL (ref 0–0.51)
EOSINOPHIL NFR BLD: 2.5 % (ref 0–6.9)
ERYTHROCYTE [DISTWIDTH] IN BLOOD BY AUTOMATED COUNT: 39.9 FL (ref 35.9–50)
ERYTHROCYTE [SEDIMENTATION RATE] IN BLOOD BY WESTERGREN METHOD: 8 MM/HOUR (ref 0–25)
GFR SERPLBLD CREATININE-BSD FMLA CKD-EPI: 122 ML/MIN/1.73 M 2
GLOBULIN SER CALC-MCNC: 2.6 G/DL (ref 1.9–3.5)
GLUCOSE SERPL-MCNC: 104 MG/DL (ref 65–99)
HCT VFR BLD AUTO: 42.1 % (ref 37–47)
HDLC SERPL-MCNC: 44 MG/DL
HGB BLD-MCNC: 14 G/DL (ref 12–16)
IMM GRANULOCYTES # BLD AUTO: 0.01 K/UL (ref 0–0.11)
IMM GRANULOCYTES NFR BLD AUTO: 0.1 % (ref 0–0.9)
LDLC SERPL CALC-MCNC: 85 MG/DL
LYMPHOCYTES # BLD AUTO: 1.87 K/UL (ref 1–4.8)
LYMPHOCYTES NFR BLD: 28 % (ref 22–41)
MCH RBC QN AUTO: 28.3 PG (ref 27–33)
MCHC RBC AUTO-ENTMCNC: 33.3 G/DL (ref 32.2–35.5)
MCV RBC AUTO: 85.2 FL (ref 81.4–97.8)
MONOCYTES # BLD AUTO: 0.36 K/UL (ref 0–0.85)
MONOCYTES NFR BLD AUTO: 5.4 % (ref 0–13.4)
NEUTROPHILS # BLD AUTO: 4.21 K/UL (ref 1.82–7.42)
NEUTROPHILS NFR BLD: 63.1 % (ref 44–72)
NRBC # BLD AUTO: 0 K/UL
NRBC BLD-RTO: 0 /100 WBC (ref 0–0.2)
PLATELET # BLD AUTO: 258 K/UL (ref 164–446)
PMV BLD AUTO: 12.5 FL (ref 9–12.9)
POTASSIUM SERPL-SCNC: 4 MMOL/L (ref 3.6–5.5)
PROT SERPL-MCNC: 7.2 G/DL (ref 6–8.2)
RBC # BLD AUTO: 4.94 M/UL (ref 4.2–5.4)
RHEUMATOID FACT SER IA-ACNC: <10 IU/ML (ref 0–14)
SODIUM SERPL-SCNC: 136 MMOL/L (ref 135–145)
TRIGL SERPL-MCNC: 66 MG/DL (ref 0–149)
TSH SERPL DL<=0.005 MIU/L-ACNC: 1.06 UIU/ML (ref 0.38–5.33)
WBC # BLD AUTO: 6.7 K/UL (ref 4.8–10.8)

## 2024-04-20 PROCEDURE — 80061 LIPID PANEL: CPT

## 2024-04-20 PROCEDURE — 86900 BLOOD TYPING SEROLOGIC ABO: CPT

## 2024-04-20 PROCEDURE — 85652 RBC SED RATE AUTOMATED: CPT

## 2024-04-20 PROCEDURE — 86480 TB TEST CELL IMMUN MEASURE: CPT

## 2024-04-20 PROCEDURE — 36415 COLL VENOUS BLD VENIPUNCTURE: CPT

## 2024-04-20 PROCEDURE — 82306 VITAMIN D 25 HYDROXY: CPT

## 2024-04-20 PROCEDURE — 86200 CCP ANTIBODY: CPT

## 2024-04-20 PROCEDURE — 86038 ANTINUCLEAR ANTIBODIES: CPT

## 2024-04-20 PROCEDURE — 85025 COMPLETE CBC W/AUTO DIFF WBC: CPT

## 2024-04-20 PROCEDURE — 86140 C-REACTIVE PROTEIN: CPT

## 2024-04-20 PROCEDURE — 84443 ASSAY THYROID STIM HORMONE: CPT

## 2024-04-20 PROCEDURE — 86431 RHEUMATOID FACTOR QUANT: CPT

## 2024-04-20 PROCEDURE — 80053 COMPREHEN METABOLIC PANEL: CPT

## 2024-04-20 PROCEDURE — 86256 FLUORESCENT ANTIBODY TITER: CPT

## 2024-04-22 LAB
CCP IGA+IGG SERPL IA-ACNC: 4 UNITS (ref 0–19)
GAMMA INTERFERON BACKGROUND BLD IA-ACNC: 0.05 IU/ML
M TB IFN-G BLD-IMP: NEGATIVE
M TB IFN-G CD4+ BCKGRND COR BLD-ACNC: 0 IU/ML
MITOGEN IGNF BCKGRD COR BLD-ACNC: >10 IU/ML
NUCLEAR IGG SER QL IA: NORMAL
QFT TB2 - NIL TBQ2: 0 IU/ML

## 2024-04-23 LAB — DSDNA IGG TITR SER CLIF: NORMAL {TITER}

## 2025-01-12 ENCOUNTER — OFFICE VISIT (OUTPATIENT)
Dept: URGENT CARE | Facility: CLINIC | Age: 45
End: 2025-01-12
Payer: COMMERCIAL

## 2025-01-12 VITALS
RESPIRATION RATE: 16 BRPM | HEART RATE: 86 BPM | DIASTOLIC BLOOD PRESSURE: 92 MMHG | BODY MASS INDEX: 34.36 KG/M2 | SYSTOLIC BLOOD PRESSURE: 144 MMHG | OXYGEN SATURATION: 97 % | WEIGHT: 182 LBS | TEMPERATURE: 98.2 F | HEIGHT: 61 IN

## 2025-01-12 DIAGNOSIS — J10.1 INFLUENZA A: ICD-10-CM

## 2025-01-12 PROCEDURE — 99213 OFFICE O/P EST LOW 20 MIN: CPT | Performed by: FAMILY MEDICINE

## 2025-01-12 PROCEDURE — 3077F SYST BP >= 140 MM HG: CPT | Performed by: FAMILY MEDICINE

## 2025-01-12 PROCEDURE — 3080F DIAST BP >= 90 MM HG: CPT | Performed by: FAMILY MEDICINE

## 2025-01-12 RX ORDER — OSELTAMIVIR PHOSPHATE 75 MG/1
75 CAPSULE ORAL 2 TIMES DAILY
Qty: 10 CAPSULE | Refills: 0 | Status: SHIPPED | OUTPATIENT
Start: 2025-01-12

## 2025-01-12 ASSESSMENT — ENCOUNTER SYMPTOMS
COUGH: 1
MYALGIAS: 1

## 2025-01-12 ASSESSMENT — FIBROSIS 4 INDEX: FIB4 SCORE: 0.48

## 2025-01-12 NOTE — PROGRESS NOTES
Subjective:     Ceci Phillips is a 44 y.o. female who presents for Cough (Cough, headache, chest tightness x 1 day/Son tested positive for Flu A)    HPI  Pt presents for evaluation of an acute problem  Pt with new illness which started last night   Having some headaches   Cough and chest tightness at times   Sore throat is moderate   No ear pain   Multiple family members with same symptoms, son with influenza A     Review of Systems   Constitutional:  Positive for malaise/fatigue.   Respiratory:  Positive for cough.    Musculoskeletal:  Positive for myalgias.       PMH:  has a past medical history of Anxiety, ASTHMA, Depression, Tuberculosis, and Urinary tract infection, site not specified.    She has no past medical history of Allergy, Diabetes, GERD (gastroesophageal reflux disease), Headache(784.0), Hyperlipidemia, Hypertension, Kidney disease, Seizure (HCC), Stroke ([facility]), or Substance abuse ([facility]).  MEDS:   Current Outpatient Medications:     oseltamivir (TAMIFLU) 75 MG Cap, Take 1 Capsule by mouth 2 times a day., Disp: 10 Capsule, Rfl: 0    ibuprofen (MOTRIN) 800 MG Tab, Take 1 Tab by mouth every 8 hours as needed., Disp: 60 Tab, Rfl: 0    benzonatate (TESSALON) 100 MG Cap, Take 1 Cap by mouth 3 times a day as needed for Cough. (Patient not taking: Reported on 1/12/2025), Disp: 60 Cap, Rfl: 0    promethazine-dextromethorphan (PROMETHAZINE-DM) 6.25-15 MG/5ML syrup, Take 5 mL by mouth every four hours as needed for Cough. (Patient not taking: Reported on 1/12/2025), Disp: 100 mL, Rfl: 0    Cholecalciferol (VITAMIN D PO), Take  by mouth. (Patient not taking: Reported on 1/12/2025), Disp: , Rfl:   ALLERGIES:   Allergies   Allergen Reactions    Pcn [Penicillins]      SURGHX:   Past Surgical History:   Procedure Laterality Date    CHOLECYSTECTOMY      DENTAL EXTRACTION(S)      PRIMARY C SECTION      x 2     SOCHX:  reports that she has never smoked. She has never used smokeless tobacco. She reports that she does not  "currently use alcohol. She reports that she does not use drugs.     Objective:   BP (!) 144/92 (BP Location: Left arm, Patient Position: Sitting, BP Cuff Size: Adult)   Pulse 86   Temp 36.8 °C (98.2 °F) (Temporal)   Resp 16   Ht 1.549 m (5' 1\")   Wt 82.6 kg (182 lb)   SpO2 97%   BMI 34.39 kg/m²     Physical Exam  Constitutional:       General: She is not in acute distress.     Appearance: She is well-developed. She is not diaphoretic.   HENT:      Head: Normocephalic and atraumatic.      Right Ear: Tympanic membrane, ear canal and external ear normal.      Left Ear: Tympanic membrane, ear canal and external ear normal.      Mouth/Throat:      Mouth: Mucous membranes are moist.      Pharynx: Oropharynx is clear. No oropharyngeal exudate or posterior oropharyngeal erythema.   Neck:      Trachea: No tracheal deviation.   Cardiovascular:      Rate and Rhythm: Normal rate and regular rhythm.   Pulmonary:      Effort: Pulmonary effort is normal. No respiratory distress.      Breath sounds: Normal breath sounds. No wheezing or rales.   Musculoskeletal:      Cervical back: Normal range of motion and neck supple. Tenderness present.   Lymphadenopathy:      Cervical: No cervical adenopathy.   Neurological:      Mental Status: She is alert.         Assessment/Plan:   Assessment    1. Influenza A  - oseltamivir (TAMIFLU) 75 MG Cap; Take 1 Capsule by mouth 2 times a day.  Dispense: 10 Capsule; Refill: 0    Patient with influenza A.  Other family members have tested positive and recommended empiric treatment.  Reviewed the risks and benefit of Tamiflu versus supportive care measures only.  Patient prefers to have treatment and Tamiflu sent to pharmacy.  All questions answered and will follow-up in the urgent care as needed.    "